# Patient Record
Sex: FEMALE | Race: OTHER | Employment: UNEMPLOYED | URBAN - METROPOLITAN AREA
[De-identification: names, ages, dates, MRNs, and addresses within clinical notes are randomized per-mention and may not be internally consistent; named-entity substitution may affect disease eponyms.]

---

## 2022-08-20 ENCOUNTER — APPOINTMENT (OUTPATIENT)
Dept: CT IMAGING | Age: 42
End: 2022-08-20
Payer: COMMERCIAL

## 2022-08-20 ENCOUNTER — HOSPITAL ENCOUNTER (EMERGENCY)
Age: 42
Discharge: HOME | End: 2022-08-20
Attending: EMERGENCY MEDICINE
Payer: COMMERCIAL

## 2022-08-20 VITALS
HEIGHT: 68 IN | TEMPERATURE: 97.3 F | SYSTOLIC BLOOD PRESSURE: 142 MMHG | WEIGHT: 204 LBS | RESPIRATION RATE: 19 BRPM | BODY MASS INDEX: 30.92 KG/M2 | HEART RATE: 98 BPM | OXYGEN SATURATION: 99 % | DIASTOLIC BLOOD PRESSURE: 87 MMHG

## 2022-08-20 DIAGNOSIS — G93.89 ENCEPHALOMALACIA: ICD-10-CM

## 2022-08-20 DIAGNOSIS — R20.0 LEFT SIDED NUMBNESS: Primary | ICD-10-CM

## 2022-08-20 LAB
ALBUMIN SERPL-MCNC: 4.6 G/DL (ref 3.5–5.2)
ALP BLD-CCNC: 65 U/L (ref 35–104)
ALT SERPL-CCNC: 12 U/L (ref 0–32)
ANION GAP SERPL CALCULATED.3IONS-SCNC: 10 MMOL/L (ref 7–16)
AST SERPL-CCNC: 15 U/L (ref 0–31)
BACTERIA: ABNORMAL /HPF
BASOPHILS ABSOLUTE: 0.03 E9/L (ref 0–0.2)
BASOPHILS RELATIVE PERCENT: 0.3 % (ref 0–2)
BILIRUB SERPL-MCNC: <0.2 MG/DL (ref 0–1.2)
BILIRUBIN URINE: NEGATIVE
BLOOD, URINE: NEGATIVE
BUN BLDV-MCNC: 12 MG/DL (ref 6–20)
CALCIUM SERPL-MCNC: 9.1 MG/DL (ref 8.6–10.2)
CHLORIDE BLD-SCNC: 107 MMOL/L (ref 98–107)
CLARITY: CLEAR
CO2: 23 MMOL/L (ref 22–29)
COLOR: YELLOW
CREAT SERPL-MCNC: 1 MG/DL (ref 0.5–1)
CRYSTALS, UA: ABNORMAL /HPF
EOSINOPHILS ABSOLUTE: 0.1 E9/L (ref 0.05–0.5)
EOSINOPHILS RELATIVE PERCENT: 0.9 % (ref 0–6)
EPITHELIAL CELLS, UA: ABNORMAL /HPF
GFR AFRICAN AMERICAN: >60
GFR NON-AFRICAN AMERICAN: >60 ML/MIN/1.73
GLUCOSE BLD-MCNC: 141 MG/DL (ref 74–99)
GLUCOSE URINE: NEGATIVE MG/DL
HCG, URINE, POC: NEGATIVE
HCT VFR BLD CALC: 37.8 % (ref 34–48)
HEMOGLOBIN: 12.2 G/DL (ref 11.5–15.5)
IMMATURE GRANULOCYTES #: 0.04 E9/L
IMMATURE GRANULOCYTES %: 0.3 % (ref 0–5)
KETONES, URINE: ABNORMAL MG/DL
LEUKOCYTE ESTERASE, URINE: ABNORMAL
LYMPHOCYTES ABSOLUTE: 2.25 E9/L (ref 1.5–4)
LYMPHOCYTES RELATIVE PERCENT: 19.5 % (ref 20–42)
Lab: NORMAL
MAGNESIUM: 2.1 MG/DL (ref 1.6–2.6)
MCH RBC QN AUTO: 28.2 PG (ref 26–35)
MCHC RBC AUTO-ENTMCNC: 32.3 % (ref 32–34.5)
MCV RBC AUTO: 87.3 FL (ref 80–99.9)
MONOCYTES ABSOLUTE: 0.66 E9/L (ref 0.1–0.95)
MONOCYTES RELATIVE PERCENT: 5.7 % (ref 2–12)
NEGATIVE QC PASS/FAIL: NORMAL
NEUTROPHILS ABSOLUTE: 8.43 E9/L (ref 1.8–7.3)
NEUTROPHILS RELATIVE PERCENT: 73.3 % (ref 43–80)
NITRITE, URINE: NEGATIVE
PDW BLD-RTO: 11.9 FL (ref 11.5–15)
PH UA: 6 (ref 5–9)
PLATELET # BLD: 363 E9/L (ref 130–450)
PMV BLD AUTO: 9.3 FL (ref 7–12)
POSITIVE QC PASS/FAIL: NORMAL
POTASSIUM SERPL-SCNC: 3.8 MMOL/L (ref 3.5–5)
PROTEIN UA: NEGATIVE MG/DL
RBC # BLD: 4.33 E12/L (ref 3.5–5.5)
RBC UA: ABNORMAL /HPF (ref 0–2)
SODIUM BLD-SCNC: 140 MMOL/L (ref 132–146)
SPECIFIC GRAVITY UA: >=1.03 (ref 1–1.03)
TOTAL PROTEIN: 7.6 G/DL (ref 6.4–8.3)
TROPONIN, HIGH SENSITIVITY: <6 NG/L (ref 0–9)
UROBILINOGEN, URINE: 1 E.U./DL
WBC # BLD: 11.5 E9/L (ref 4.5–11.5)
WBC UA: ABNORMAL /HPF (ref 0–5)

## 2022-08-20 PROCEDURE — 36415 COLL VENOUS BLD VENIPUNCTURE: CPT

## 2022-08-20 PROCEDURE — 93005 ELECTROCARDIOGRAM TRACING: CPT | Performed by: PHYSICIAN ASSISTANT

## 2022-08-20 PROCEDURE — 83735 ASSAY OF MAGNESIUM: CPT

## 2022-08-20 PROCEDURE — 80053 COMPREHEN METABOLIC PANEL: CPT

## 2022-08-20 PROCEDURE — 70450 CT HEAD/BRAIN W/O DYE: CPT

## 2022-08-20 PROCEDURE — 99284 EMERGENCY DEPT VISIT MOD MDM: CPT

## 2022-08-20 PROCEDURE — 72125 CT NECK SPINE W/O DYE: CPT

## 2022-08-20 PROCEDURE — 81001 URINALYSIS AUTO W/SCOPE: CPT

## 2022-08-20 PROCEDURE — 84484 ASSAY OF TROPONIN QUANT: CPT

## 2022-08-20 PROCEDURE — 85025 COMPLETE CBC W/AUTO DIFF WBC: CPT

## 2022-08-20 RX ORDER — DIPHENHYDRAMINE HYDROCHLORIDE 50 MG/ML
25 INJECTION INTRAMUSCULAR; INTRAVENOUS ONCE
Status: DISCONTINUED | OUTPATIENT
Start: 2022-08-20 | End: 2022-08-20 | Stop reason: HOSPADM

## 2022-08-20 RX ORDER — 0.9 % SODIUM CHLORIDE 0.9 %
1000 INTRAVENOUS SOLUTION INTRAVENOUS ONCE
Status: DISCONTINUED | OUTPATIENT
Start: 2022-08-20 | End: 2022-08-20 | Stop reason: HOSPADM

## 2022-08-20 RX ORDER — METOCLOPRAMIDE HYDROCHLORIDE 5 MG/ML
10 INJECTION INTRAMUSCULAR; INTRAVENOUS ONCE
Status: DISCONTINUED | OUTPATIENT
Start: 2022-08-20 | End: 2022-08-20 | Stop reason: HOSPADM

## 2022-08-20 ASSESSMENT — ENCOUNTER SYMPTOMS
BACK PAIN: 0
ABDOMINAL PAIN: 0
COUGH: 0
RHINORRHEA: 0
NAUSEA: 0
VOMITING: 0
BLOOD IN STOOL: 0
COLOR CHANGE: 0
SHORTNESS OF BREATH: 0

## 2022-08-20 ASSESSMENT — PAIN - FUNCTIONAL ASSESSMENT: PAIN_FUNCTIONAL_ASSESSMENT: 0-10

## 2022-08-20 NOTE — ED PROVIDER NOTES
ED PROVIDER NOTE    Chief Complaint   Patient presents with    Numbness     Patient states intermittent left sided numbness x one week. States syncopal episode in store last week. Denies injury. Hx of hydrocephalus. +intermittent dizziness. +n/-v/-d. A&O x 3 and ambulatory into ED. HPI:  22,   Time: 7:40 PM EDT       Theresa Zuluaga is a 37 y.o. female presenting to the ED for left sided numbness. Gradual onset and intermittent over the past 1-2 weeks, moderate in severity, no aggravating/alleviating factors. Hx of  hydrocephalus s/p  shunt in 1980s, shunt is not currently functional.  Patient moved here from [place] a couple months ago and has not seen neurosurgery locally. Has chronic headaches unchanged from baseline. Associated nausea. No associated fever, chills, neck stiffness, chest pain, cough, shortness of breath, abdominal pain, vomiting, diarrhea. No drug/etoh use. Chart review: hx of hydrocephalus, migraines    Review of Systems:     Review of Systems   Constitutional:  Negative for appetite change, chills and fever. HENT:  Negative for congestion and rhinorrhea. Eyes:  Negative for visual disturbance. Respiratory:  Negative for cough and shortness of breath. Cardiovascular:  Negative for chest pain. Gastrointestinal:  Negative for abdominal pain, blood in stool, nausea and vomiting. Genitourinary:  Negative for decreased urine volume and difficulty urinating. Musculoskeletal:  Negative for back pain and neck pain. Skin:  Negative for color change. Neurological:  Positive for numbness and headaches. Negative for dizziness, syncope, weakness and light-headedness. --------------------------------------------- PAST HISTORY ---------------------------------------------  Past Medical History:   Past Medical History:   Diagnosis Date    Hydrocephalus ([address]     Migraines        Past Surgical History:   History reviewed.  No pertinent surgical history. Social History:   Social History     Socioeconomic History    Marital status: Unknown     Spouse name: None    Number of children: None    Years of education: None    Highest education level: None   Tobacco Use    Smoking status: Never    Smokeless tobacco: Never   Substance and Sexual Activity    Drug use: Never       Family History:   History reviewed. No pertinent family history. The patients home medications have been reviewed. Allergies:   No Known Allergies        ---------------------------------------------------PHYSICAL EXAM--------------------------------------    BP (!) 146/82   Pulse (!) 110   Temp 97.3 °F (36.3 °C)   Resp 16   Ht 5' 8\" (1.727 m)   Wt 204 lb (92.5 kg)   LMP 08/12/2022   SpO2 96%   BMI 31.02 kg/m²     Physical Exam  Vitals and nursing note reviewed. Constitutional:       General: She is not in acute distress. Appearance: She is not toxic-appearing. HENT:      Mouth/Throat:      Mouth: Mucous membranes are moist.   Eyes:      General: No scleral icterus. Extraocular Movements: Extraocular movements intact. Pupils: Pupils are equal, round, and reactive to light. Cardiovascular:      Rate and Rhythm: Normal rate and regular rhythm. Pulses: Normal pulses. Heart sounds: Normal heart sounds. No murmur heard. Pulmonary:      Effort: Pulmonary effort is normal. No respiratory distress. Breath sounds: Normal breath sounds. No wheezing or rales. Abdominal:      General: There is no distension. Palpations: Abdomen is soft. Tenderness: There is no abdominal tenderness. Musculoskeletal:         General: No swelling or tenderness. Normal range of motion. Cervical back: Normal range of motion and neck supple. Comments: Radial, DP, and PT pulses 2+ bilaterally. Skin:     General: Skin is warm and dry. Neurological:      Mental Status: She is alert and oriented to person, place, and time.       Comments: Strength 5/5 and sensation grossly intact to light touch throughout all extremities. Sensation is diminished diffusely throughout left side of face, LUE, LLE, and left side of trunk compared with the right side. -------------------------------------------------- RESULTS -------------------------------------------------  I have personally reviewed all laboratory and imaging results for this patient. Results are listed below. LABS:  Labs Reviewed   CBC WITH AUTO DIFFERENTIAL - Abnormal; Notable for the following components:       Result Value    Lymphocytes % 19.5 (*)     Neutrophils Absolute 8.43 (*)     All other components within normal limits   COMPREHENSIVE METABOLIC PANEL - Abnormal; Notable for the following components:    Glucose 141 (*)     All other components within normal limits   URINALYSIS WITH MICROSCOPIC - Abnormal; Notable for the following components:    Ketones, Urine TRACE (*)     Leukocyte Esterase, Urine TRACE (*)     Bacteria, UA FEW (*)     Crystals, UA Few (*)     All other components within normal limits   POC PREGNANCY UR-QUAL - Normal   MAGNESIUM   TROPONIN       RADIOLOGY:  Interpreted personally and by Radiologist.  CT HEAD WO CONTRAST   Final Result   No acute intracranial abnormality. Right frontal catheter is noted with the   tip in the CSF space anteriorly. Atrophy and encephalomalacia in the right   frontal area. No acute cervical spine abnormality. CT CERVICAL SPINE WO CONTRAST   Final Result   No acute intracranial abnormality. Right frontal catheter is noted with the   tip in the CSF space anteriorly. Atrophy and encephalomalacia in the right   frontal area. No acute cervical spine abnormality. EKG:  This EKG is signed and interpreted by the EP.     Normal sinus rhythm, vent rate 88bpm, normal axis and intervals, no acute injury pattern, no clinically significant change compared w/ prior EKG       ------------------------- NURSING NOTES AND VITALS REVIEWED ---------------------------   The nursing notes within the ED encounter and vital signs as below have been reviewed by myself. BP (!) 146/82   Pulse (!) 110   Temp 97.3 °F (36.3 °C)   Resp 16   Ht 5' 8\" (1.727 m)   Wt 204 lb (92.5 kg)   LMP 2022   SpO2 96%   BMI 31.02 kg/m²   Oxygen Saturation Interpretation: Normal    The patients available past medical records and past encounters were reviewed. ------------------------------ ED COURSE/MEDICAL DECISION MAKING----------------------    Counseling: The emergency provider has spoken with the patient and discussed todays results, in addition to providing specific details for the plan of care and counseling regarding the diagnosis and prognosis. Questions are answered at this time and they are agreeable with the plan. ED Course/Medical Decision Makin y.o. female here with left sided numbness ongoing for the past 1-2 weeks. Non-toxic appearing, afebrile, hemodynamically stable, and in no acute distress. Breathing comfortably on room air without respiratory distress. Diffusely diminished left sided sensation. Imaging shows chronic right frontal encephalomalacia. No indication for admission or further imaging at this time. Low suspicion for acute ischemic stroke. After discussion of findings and return precautions, patient agrees with plan for discharge and outpatient follow up with PCP and neurology. --------------------------------- IMPRESSION AND DISPOSITION ---------------------------------    IMPRESSION  1. Left sided numbness    2. Encephalomalacia        DISPOSITION  Disposition: Discharge to home  Patient condition is good    NOTE: This report was transcribed using voice recognition software.  Every effort was made to ensure accuracy; however, inadvertent computerized transcription errors may be present    Rock Shayy MD  Attending Emergency Physician         Rock Shayy MD  [phone]

## 2022-08-20 NOTE — ED NOTES
Department of Emergency Medicine  FIRST PROVIDER TRIAGE NOTE             Independent MLP           8/20/22  5:31 PM EDT    Date of Encounter: 8/20/22   MRN: [id number]      HPI: Apurva Nunez is a 37 y.o. female who presents to the ED for Numbness (Patient states intermittent left sided numbness x one week. States syncopal episode in store last week. Denies injury. Hx of hydrocephalus. +intermittent dizziness. +n/-v/-d. A&O x 3 and ambulatory into ED. )    Patient has a 70-year-old that has a history of hydrocephalus. Patient states for the last few days she has had left-sided numbness on her upper and lower extremity. Patient states a few times this week she started to pass out and was caught by her . Patient states she did not hit her head. Patient states she is had intermittent dizziness. Patient states he just feels less sensitive on that side. ROS: Negative for cp, sob, abd pain, or back pain. PE: Gen Appearance/Constitutional: alert  HEENT: NC/NT. PERRLA,  Airway patent. Neck: supple     Initial Plan of Care: All treatment areas with department are currently occupied. Plan to order/Initiate the following while awaiting opening in ED: labs, EKG, and imaging studies.   Initiate Treatment-Testing, Proceed toTreatment Area When Bed Available for ED Attending/MLP to Continue Care    Electronically signed by Terrie Tate PA-C   DD: 8/20/22       Terrie Tate PA-C  08/20/22 [id number]

## 2022-08-21 LAB
EKG ATRIAL RATE: 88 BPM
EKG P AXIS: 70 DEGREES
EKG P-R INTERVAL: 158 MS
EKG Q-T INTERVAL: 380 MS
EKG QRS DURATION: 80 MS
EKG QTC CALCULATION (BAZETT): 459 MS
EKG R AXIS: 60 DEGREES
EKG T AXIS: 54 DEGREES
EKG VENTRICULAR RATE: 88 BPM

## 2022-08-21 NOTE — DISCHARGE INSTRUCTIONS
Return to the ER if you have worsening headache, vomiting, unable to keep down food or drink, difficulty walking, talking, or seeing, stiffness in your neck, or any other new or concerning symptoms. Follow up with your primary care physician and neurology at the next available appointment.

## 2022-08-22 ENCOUNTER — TELEPHONE (OUTPATIENT)
Dept: NEUROLOGY | Age: 42
End: 2022-08-22

## 2022-08-22 NOTE — TELEPHONE ENCOUNTER
ED 8/20/22 Encephalomalacia  Left sided numbness. CT Head/spine [facility]. Patient requesting follow up per discharge instructions/ Please advise patient for follow up recommendation [phone].

## 2022-08-23 ENCOUNTER — TELEPHONE (OUTPATIENT)
Dept: FAMILY MEDICINE CLINIC | Age: 42
End: 2022-08-23

## 2022-08-23 NOTE — TELEPHONE ENCOUNTER
----- Message from Judy Camarillo sent at 8/22/2022 10:02 AM EDT -----  Subject: Message to Provider    QUESTIONS  Information for Provider? PT IS GOING TO BE A NEW PT EST CARE BUT WENT TO   [facility] ON 8- FOR LEFT SIDE NUMBNESS AND NEEDS A SOONER   APPT IF POSSIBLE SCREENED GREEN  ---------------------------------------------------------------------------  --------------  Chad BEASLEY  [phone]; OK to leave message on voicemail  ---------------------------------------------------------------------------  --------------  SCRIPT ANSWERS  Relationship to Patient?  Self

## 2022-08-30 ENCOUNTER — OFFICE VISIT (OUTPATIENT)
Dept: FAMILY MEDICINE CLINIC | Age: 42
End: 2022-08-30
Payer: COMMERCIAL

## 2022-08-30 VITALS
SYSTOLIC BLOOD PRESSURE: 118 MMHG | TEMPERATURE: 98 F | BODY MASS INDEX: 31.98 KG/M2 | DIASTOLIC BLOOD PRESSURE: 78 MMHG | RESPIRATION RATE: 16 BRPM | HEIGHT: 66 IN | OXYGEN SATURATION: 97 % | WEIGHT: 199 LBS

## 2022-08-30 DIAGNOSIS — G93.89 ENCEPHALOMALACIA: Primary | ICD-10-CM

## 2022-08-30 DIAGNOSIS — R53.83 FATIGUE, UNSPECIFIED TYPE: ICD-10-CM

## 2022-08-30 DIAGNOSIS — G43.809 OTHER MIGRAINE WITHOUT STATUS MIGRAINOSUS, NOT INTRACTABLE: ICD-10-CM

## 2022-08-30 DIAGNOSIS — E66.09 CLASS 1 OBESITY DUE TO EXCESS CALORIES WITHOUT SERIOUS COMORBIDITY WITH BODY MASS INDEX (BMI) OF 32.0 TO 32.9 IN ADULT: ICD-10-CM

## 2022-08-30 DIAGNOSIS — Z86.69 HISTORY OF HYDROCEPHALUS AS A CHILD: ICD-10-CM

## 2022-08-30 DIAGNOSIS — Z98.2 S/P VP SHUNT: ICD-10-CM

## 2022-08-30 PROBLEM — G91.9 HYDROCEPHALUS (HCC): Status: ACTIVE | Noted: 2022-08-30

## 2022-08-30 LAB
CHOLESTEROL, TOTAL: 191 MG/DL (ref 0–199)
HBA1C MFR BLD: 6.1 % (ref 4–5.6)
HDLC SERPL-MCNC: 38 MG/DL
LDL CHOLESTEROL CALCULATED: 127 MG/DL (ref 0–99)
TRIGL SERPL-MCNC: 128 MG/DL (ref 0–149)
VLDLC SERPL CALC-MCNC: 26 MG/DL

## 2022-08-30 PROCEDURE — 99203 OFFICE O/P NEW LOW 30 MIN: CPT | Performed by: FAMILY MEDICINE

## 2022-08-30 PROCEDURE — 36415 COLL VENOUS BLD VENIPUNCTURE: CPT | Performed by: FAMILY MEDICINE

## 2022-08-30 SDOH — ECONOMIC STABILITY: FOOD INSECURITY: WITHIN THE PAST 12 MONTHS, YOU WORRIED THAT YOUR FOOD WOULD RUN OUT BEFORE YOU GOT MONEY TO BUY MORE.: NEVER TRUE

## 2022-08-30 SDOH — HEALTH STABILITY: PHYSICAL HEALTH: ON AVERAGE, HOW MANY DAYS PER WEEK DO YOU ENGAGE IN MODERATE TO STRENUOUS EXERCISE (LIKE A BRISK WALK)?: 5 DAYS

## 2022-08-30 SDOH — ECONOMIC STABILITY: FOOD INSECURITY: WITHIN THE PAST 12 MONTHS, THE FOOD YOU BOUGHT JUST DIDN'T LAST AND YOU DIDN'T HAVE MONEY TO GET MORE.: NEVER TRUE

## 2022-08-30 ASSESSMENT — ENCOUNTER SYMPTOMS
DIARRHEA: 0
SHORTNESS OF BREATH: 0
VOMITING: 0
ABDOMINAL PAIN: 0
NAUSEA: 0
EYE PAIN: 0
COUGH: 0
BLOOD IN STOOL: 0
EYE DISCHARGE: 0
CONSTIPATION: 0

## 2022-08-30 ASSESSMENT — PATIENT HEALTH QUESTIONNAIRE - PHQ9
1. LITTLE INTEREST OR PLEASURE IN DOING THINGS: 2
9. THOUGHTS THAT YOU WOULD BE BETTER OFF DEAD, OR OF HURTING YOURSELF: 0
SUM OF ALL RESPONSES TO PHQ QUESTIONS 1-9: 10
8. MOVING OR SPEAKING SO SLOWLY THAT OTHER PEOPLE COULD HAVE NOTICED. OR THE OPPOSITE, BEING SO FIGETY OR RESTLESS THAT YOU HAVE BEEN MOVING AROUND A LOT MORE THAN USUAL: 0
6. FEELING BAD ABOUT YOURSELF - OR THAT YOU ARE A FAILURE OR HAVE LET YOURSELF OR YOUR FAMILY DOWN: 0
2. FEELING DOWN, DEPRESSED OR HOPELESS: 2
10. IF YOU CHECKED OFF ANY PROBLEMS, HOW DIFFICULT HAVE THESE PROBLEMS MADE IT FOR YOU TO DO YOUR WORK, TAKE CARE OF THINGS AT HOME, OR GET ALONG WITH OTHER PEOPLE: 1
7. TROUBLE CONCENTRATING ON THINGS, SUCH AS READING THE NEWSPAPER OR WATCHING TELEVISION: 0
SUM OF ALL RESPONSES TO PHQ QUESTIONS 1-9: 10
3. TROUBLE FALLING OR STAYING ASLEEP: 2
5. POOR APPETITE OR OVEREATING: 2
SUM OF ALL RESPONSES TO PHQ9 QUESTIONS 1 & 2: 4
4. FEELING TIRED OR HAVING LITTLE ENERGY: 2

## 2022-08-30 ASSESSMENT — SOCIAL DETERMINANTS OF HEALTH (SDOH)
WITHIN THE LAST YEAR, HAVE YOU BEEN AFRAID OF YOUR PARTNER OR EX-PARTNER?: NO
WITHIN THE LAST YEAR, HAVE YOU BEEN HUMILIATED OR EMOTIONALLY ABUSED IN OTHER WAYS BY YOUR PARTNER OR EX-PARTNER?: PATIENT DECLINED
WITHIN THE LAST YEAR, HAVE TO BEEN RAPED OR FORCED TO HAVE ANY KIND OF SEXUAL ACTIVITY BY YOUR PARTNER OR EX-PARTNER?: NO
WITHIN THE LAST YEAR, HAVE YOU BEEN KICKED, HIT, SLAPPED, OR OTHERWISE PHYSICALLY HURT BY YOUR PARTNER OR EX-PARTNER?: NO
HOW HARD IS IT FOR YOU TO PAY FOR THE VERY BASICS LIKE FOOD, HOUSING, MEDICAL CARE, AND HEATING?: NOT HARD AT ALL

## 2022-08-30 NOTE — PROGRESS NOTES
[facility]  DATE OF VISIT : 2022    Patient : Mariely Lopez   Age : 37 y.o.  : 1980   MRN : [id number]   ______________________________________________________________________    Chief Complaint :   Chief Complaint   Patient presents with    New Patient     Patient is here today to establish care as a new patient with provider. Patient was admitted encephalomalacia. Patient was placed on Compazine 10 mg ion  but stopped taking it due to no having insurance. HPI : Mariely Lopez is 37 y.o. female who presented to the clinic today for new patient encounter. History of  hydrocephalus s/p  shunt in  which is currently nonfunctional at the time. Recent visit to the ED on  for gradual onset left-sided weakness. At the time she was found to have diminished sensation diffusely through the left side face, LUE, LLE and trunk compared to the right. Imaging in the ED did show atrophy and encephalomalacia in the right frontal area. Patient was discharged home with plan to follow-up with neurology, referral was placed to Dr. Yadira Rodriguez. History of migraines: Had previously been on Compazine but has not been taking since 2018. Having daily headaches but does have migraines about 3 times a month. History of childhood asthma: has not needed an inhaler in years. I reviewed the patient's past medications, allergies and past medical history during this visit.     Past Medical History :    Health Maintenance-   Colonoscopy- no    Ob/Gyn:  Menarche- 12yo  LMP- 8/15  Last PAP smear- 2017- no abnormal  Ob Hx- G0    Mammogram- 2017- normal      Past Medical History:   Diagnosis Date    Anxiety     Asthma     Hydrocephalus ([address]     Hydrocephalus (HCC)     Low iron     Migraines      Past Surgical History:   Procedure Laterality Date    FINGER SURGERY      SHUNT EXTERNALIZATION         Social History :  Social History       Tobacco History Smoking Status  Never      Smokeless Tobacco Use  Never              Alcohol History       Alcohol Use Status  Not Asked              Drug Use       Drug Use Status  Never              Sexual Activity       Sexually Active  Not Asked                     Allergies : Allergies   Allergen Reactions    Aspirin Headaches, Nausea Only and Other (See Comments)    Adhesive Tape Hives, Itching and Rash       Medication List :    No current outpatient medications on file. No current facility-administered medications for this visit. Review of Systems :  Review of Systems   Constitutional:  Negative for chills, diaphoresis and fever. Eyes:  Negative for pain, discharge and visual disturbance. Respiratory:  Negative for cough and shortness of breath. Cardiovascular:  Negative for chest pain, palpitations and leg swelling. Gastrointestinal:  Negative for abdominal pain, blood in stool, constipation, diarrhea, nausea and vomiting. Endocrine: Negative for polyuria. Genitourinary:  Negative for difficulty urinating, dysuria, hematuria and urgency. Musculoskeletal:  Negative for arthralgias, joint swelling and myalgias. Neurological:  Positive for numbness and headaches. Negative for dizziness, weakness and light-headedness. Psychiatric/Behavioral:  Negative for behavioral problems and sleep disturbance. The patient is not nervous/anxious. ______________________________________________________________________    Physical Exam :    Vitals: /78 (Site: Left Upper Arm, Position: Sitting, Cuff Size: Large Adult)   Temp 98 °F (36.7 °C) (Temporal)   Resp 16   Ht 5' 6\" (1.676 m)   Wt 199 lb (90.3 kg)   LMP 08/15/2022   SpO2 97%   BMI 32.12 kg/m²   Physical Exam  Constitutional:       General: She is not in acute distress. Appearance: Normal appearance. She is not ill-appearing. Cardiovascular:      Rate and Rhythm: Normal rate and regular rhythm. Pulses: Normal pulses. Heart sounds: Normal heart sounds. No murmur heard. Pulmonary:      Effort: Pulmonary effort is normal. No respiratory distress. Breath sounds: Normal breath sounds. No wheezing. Abdominal:      General: Bowel sounds are normal. There is no distension. Palpations: Abdomen is soft. Tenderness: There is no abdominal tenderness. Musculoskeletal:      Right lower leg: No edema. Left lower leg: No edema. Neurological:      Mental Status: She is alert and oriented to person, place, and time. Cranial Nerves: No cranial nerve deficit. Sensory: Sensory deficit (LUE, LLE and left side of the trunk.) present. Motor: No weakness. ___________________    Assessment & Plan :    1. Encephalomalacia  2. History of hydrocephalus as a child  3. S/P  shunt  -Noted on CT scan of the head during recent ER visit  -Plan for follow-up with neurology  - Heladio Mcneill MD, Neurology, [place]    4. Class 1 obesity due to excess calories without serious comorbidity with body mass index (BMI) of 32.0 to 32.9 in adult  - Hemoglobin A1C; Future  - LIPID PANEL; Future    5. Health maintenance  - Hepatitis C Antibody; Future  - HIV Screen; Future    Educational materials and/or home exercises printed for patient's review and were included in patient instructions on his/her After Visit Summary and given to patient at the end of visit. Counseled regarding above diagnosis, including possible risks and complications,  especially if left uncontrolled. Counseled regarding the possible side effects, risks, benefits and alternatives to treatment; patient and/or guardian verbalizes understanding, agrees, feels comfortable with and wishes to proceed with above treatment plan. Advised patient to call with any new medication issues, and read all Rx info from pharmacy to assure aware of all possible risks and side effects of medication before taking.      Reviewed age and gender appropriate health screening exams and vaccinations. Advised patient regarding importance of keeping up with recommended health maintenance and to schedule as soon as possible if overdue, as this is important in assessing for undiagnosed pathology, especially cancer, as well as protecting against potentially harmful/life threatening disease. Patient and/or guardian verbalizes understanding and agrees with above counseling, assessment and plan. All questions answered    Additional plan and future considerations:   2 weeks for [employer]. Return to Office: Return in about 2 weeks (around 9/13/2022) for Dennys Enriquez.     Electronically signed by Herlinda Subramanian MD on 8/30/2022 at 2:08 PM

## 2022-08-31 ENCOUNTER — TELEPHONE (OUTPATIENT)
Dept: FAMILY MEDICINE CLINIC | Age: 42
End: 2022-08-31

## 2022-08-31 DIAGNOSIS — G93.89 ENCEPHALOMALACIA: Primary | ICD-10-CM

## 2022-08-31 DIAGNOSIS — G91.9 HYDROCEPHALUS, UNSPECIFIED TYPE (HCC): ICD-10-CM

## 2022-08-31 NOTE — TELEPHONE ENCOUNTER
----- Message from Carlee Moncada sent at 8/31/2022  9:53 AM EDT -----  Subject: Referral Request    Reason for referral request? Pt is requesting a referral for a neurologist   specialist because her current one is retiring. Pt would like to go to   location in or near [facility] in [facility]. Please contact pt when this   is approved and ready for scheduling. Provider patient wants to be referred to(if known):     Provider Phone Number(if known):     Additional Information for Provider?   ---------------------------------------------------------------------------  --------------  [id number]    [phone]; OK to leave message on voicemail, OK to respond with   electronic message via [employer] portal (only for patients who have   registered [employer] account)  ---------------------------------------------------------------------------  --------------

## 2022-09-04 LAB
HEPATITIS C ANTIBODY INTERPRETATION: NORMAL
HIV-1 AND HIV-2 ANTIBODIES: NORMAL

## 2022-09-13 ENCOUNTER — OFFICE VISIT (OUTPATIENT)
Dept: FAMILY MEDICINE CLINIC | Age: 42
End: 2022-09-13
Payer: COMMERCIAL

## 2022-09-13 VITALS
SYSTOLIC BLOOD PRESSURE: 128 MMHG | TEMPERATURE: 97.3 F | DIASTOLIC BLOOD PRESSURE: 80 MMHG | RESPIRATION RATE: 18 BRPM | OXYGEN SATURATION: 100 % | HEIGHT: 66 IN | BODY MASS INDEX: 31.69 KG/M2 | HEART RATE: 67 BPM | WEIGHT: 197.2 LBS

## 2022-09-13 DIAGNOSIS — Z98.890 HISTORY OF BREAST LUMP/MASS EXCISION: Primary | ICD-10-CM

## 2022-09-13 DIAGNOSIS — Z01.419 WELL WOMAN EXAM WITH ROUTINE GYNECOLOGICAL EXAM: ICD-10-CM

## 2022-09-13 PROCEDURE — 99396 PREV VISIT EST AGE 40-64: CPT | Performed by: FAMILY MEDICINE

## 2022-09-13 ASSESSMENT — ENCOUNTER SYMPTOMS: ABDOMINAL PAIN: 0

## 2022-09-13 NOTE — PROGRESS NOTES
[facility]  DATE OF VISIT : 2022    Patient : Fredo Emery   Age : 37 y.o.  : 1980   MRN : [id number]   ______________________________________________________________________    Chief Complaint :   Chief Complaint   Patient presents with    Gynecologic Exam     Pap smear       HPI : Fredo Emery is 37 y.o. female who presented to the clinic today for WWE. G 0  Menarche:16 yo  Periods:  regular every 21 days. Lasts 3-4 days. Menopause/LMP: 23    Sexually active : yes . OCP hx: never  Last Pap smear . Previous Pap smears normal.   Fm hx of Breast cancer: no  Fm hx of Ovarian cancer: no  Fm hx of Endometrial cancer: no  Fm hx of Uterine cancer: no  Fm hx of Cervical cancer: no  Doing well. No abdominal or pelvic pain. No dyspareunia. No abnormal vaginal  Discharge or bleeding. No urinary or GI symptoms. Breast: No changes in skin, no lumps, no nipple inversion, bleeding or drainage, no axillary lymphadenopathy on self exam.     History of lump in the breast, found via mammogram in 2018, had biopsy done and no evidence of malignancy. Was supposed to follow-up with yearly mammos but never got it done. The patient was informed about the importance of regular gynecological  examination and pap smear. She was also  informed of the need for yearly mammogram after the age of 37. After age 48 ,a colonoscopy should be scheduled through her primary care physician (PCP). This will help decrease the risk of colon cancer. During the reproductive years, she should take folic acid daily in order to decrease the risk of neural tube defects such as spina bifida. Adequate calcium and vitamin D intake should be added to a healthy diet ,and weight bearing exercise continued daily for improved cardiovascular and bone health. All questions have been answered.     I reviewed the patient's past medications, allergies and past medical history during this visit. Past Medical History :    Past Medical History:   Diagnosis Date    Anxiety     Asthma     Hydrocephalus ([facility] [address]     Hydrocephalus ([facility] [address]     Low iron     Migraines      Past Surgical History:   Procedure Laterality Date    FINGER SURGERY      SHUNT EXTERNALIZATION         Social History :  Social History       Tobacco History       Smoking Status  Never      Smokeless Tobacco Use  Never              Alcohol History       Alcohol Use Status  Not Currently Comment  socially              Drug Use       Drug Use Status  Not Currently Types  Marijuana [place]low)              Sexual Activity       Sexually Active  Not Asked                     Allergies : Allergies   Allergen Reactions    Aspirin Headaches, Nausea Only and Other (See Comments)    Adhesive Tape Hives, Itching and Rash       Medication List :    No current outpatient medications on file. No current facility-administered medications for this visit. Review of Systems :  Review of Systems   Gastrointestinal:  Negative for abdominal pain. Endocrine: Negative for polyuria. Genitourinary:  Negative for dyspareunia, dysuria, frequency, genital sores, hematuria, menstrual problem, pelvic pain, urgency, vaginal bleeding, vaginal discharge and vaginal pain.   ______________________________________________________________________    Physical Exam :    Vitals: /80 (Site: Left Upper Arm, Position: Sitting, Cuff Size: Large Adult)   Pulse 67   Temp 97.3 °F (36.3 °C) (Temporal)   Resp 18   Ht 5' 6\" (1.676 m)   Wt 197 lb 3.2 oz (89.4 kg)   LMP 08/15/2022   SpO2 100%   BMI 31.83 kg/m²   Physical Exam  Constitutional:       General: She is not in acute distress. Appearance: Normal appearance. She is not ill-appearing. Cardiovascular:      Rate and Rhythm: Normal rate and regular rhythm. Pulses: Normal pulses. Heart sounds: Normal heart sounds. No murmur heard.   Pulmonary:      Effort: Pulmonary effort is normal. No respiratory distress. Breath sounds: Normal breath sounds. No wheezing. Chest:   Breasts:     Breasts are symmetrical.      Right: Normal. No swelling, bleeding, inverted nipple, mass, nipple discharge, skin change, tenderness, axillary adenopathy or supraclavicular adenopathy. Left: Normal. No swelling, bleeding, inverted nipple, mass, nipple discharge, skin change, tenderness, axillary adenopathy or supraclavicular adenopathy. Abdominal:      General: Bowel sounds are normal. There is no distension. Palpations: Abdomen is soft. Tenderness: There is no abdominal tenderness. Hernia: There is no hernia in the left inguinal area or right inguinal area. Genitourinary:     Pubic Area: No rash. Labia:         Right: No rash, lesion or injury. Left: No rash, lesion or injury. Vagina: Normal. No signs of injury. No vaginal discharge, tenderness, bleeding, lesions or prolapsed vaginal walls. Cervix: No cervical motion tenderness, discharge, friability or lesion. Uterus: Normal. Not enlarged and not tender. Adnexa: Right adnexa normal and left adnexa normal.        Right: No mass, tenderness or fullness. Left: No mass, tenderness or fullness. Musculoskeletal:      Right lower leg: No edema. Left lower leg: No edema. Lymphadenopathy:      Upper Body:      Right upper body: No supraclavicular, axillary or pectoral adenopathy. Left upper body: No supraclavicular, axillary or pectoral adenopathy. Lower Body: No right inguinal adenopathy. No left inguinal adenopathy. Neurological:      Mental Status: She is alert.         ___________________    Assessment & Plan :    1. Well woman exam with routine gynecological exam  - PAP SMEAR    2. History of breast lump/mass excision  -History of breast lump on the right found on exam in 2018, assessed by mammogram and FNA performed showed no malignancy.   Patient never did follow-up mammo  - HILDA DIGITAL SCREEN BILATERAL PER PROTOCOL; Future    Educational materials and/or home exercises printed for patient's review and were included in patient instructions on his/her After Visit Summary and given to patient at the end of visit. Counseled regarding above diagnosis, including possible risks and complications,  especially if left uncontrolled. Counseled regarding the possible side effects, risks, benefits and alternatives to treatment; patient and/or guardian verbalizes understanding, agrees, feels comfortable with and wishes to proceed with above treatment plan. Advised patient to call with any new medication issues, and read all Rx info from pharmacy to assure aware of all possible risks and side effects of medication before taking. Reviewed age and gender appropriate health screening exams and vaccinations. Advised patient regarding importance of keeping up with recommended health maintenance and to schedule as soon as possible if overdue, as this is important in assessing for undiagnosed pathology, especially cancer, as well as protecting against potentially harmful/life threatening disease. Patient and/or guardian verbalizes understanding and agrees with above counseling, assessment and plan. All questions answered    Additional plan and future considerations:   RTO in 1 year if all is stable    Return to Office: No follow-ups on file.     Electronically signed by Lio Cui MD on 9/13/2022 at 1:55 PM

## 2022-09-15 LAB
HPV SAMPLE: NORMAL
HPV TYPE 16: NOT DETECTED
HPV TYPE 18: NOT DETECTED
HPV, HIGH RISK OTHER: NOT DETECTED
INTERPRETATION: NORMAL
SOURCE: NORMAL

## 2022-10-27 ENCOUNTER — TELEPHONE (OUTPATIENT)
Dept: NEUROLOGY | Age: 42
End: 2022-10-27

## 2022-11-28 ENCOUNTER — OFFICE VISIT (OUTPATIENT)
Dept: NEUROLOGY | Age: 42
End: 2022-11-28
Payer: COMMERCIAL

## 2022-11-28 VITALS
OXYGEN SATURATION: 96 % | BODY MASS INDEX: 30.22 KG/M2 | WEIGHT: 188 LBS | HEIGHT: 66 IN | DIASTOLIC BLOOD PRESSURE: 73 MMHG | TEMPERATURE: 98.3 F | HEART RATE: 68 BPM | SYSTOLIC BLOOD PRESSURE: 110 MMHG

## 2022-11-28 DIAGNOSIS — G91.9 HYDROCEPHALUS, UNSPECIFIED TYPE (HCC): Primary | ICD-10-CM

## 2022-11-28 PROCEDURE — 99203 OFFICE O/P NEW LOW 30 MIN: CPT | Performed by: NURSE PRACTITIONER

## 2022-11-28 RX ORDER — IBUPROFEN 800 MG/1
TABLET ORAL
COMMUNITY
Start: 2022-11-21

## 2022-11-28 RX ORDER — AMOXICILLIN 500 MG/1
TABLET, FILM COATED ORAL
COMMUNITY
Start: 2022-11-21

## 2022-11-28 RX ORDER — GABAPENTIN 100 MG/1
100 CAPSULE ORAL 3 TIMES DAILY
Qty: 90 CAPSULE | Refills: 2 | Status: SHIPPED | OUTPATIENT
Start: 2022-11-28 | End: 2022-12-28

## 2022-11-28 NOTE — PROGRESS NOTES
[facility] APRN NP-C   [address], [facility] [place], [address] [address]  Phone: [phone]  Fax: [phone]       Grant Beck is a 37 y.o. right handed female     Patient presents today for evaluation and management of hydrocephalus and left-sided numbness/tingling. Patient presents with her  who provides little additional history. Patient is a decent historian. Past Medical History:     Past Medical History:   Diagnosis Date    Anxiety     Asthma     Hydrocephalus ([address]     Hydrocephalus ([address]     Low iron     Migraines        Past Surgical History:       Past Surgical History:   Procedure Laterality Date    DENTAL SURGERY      all teeth removed 11/21/22    FINGER SURGERY      R 5th finger    REFRACTIVE SURGERY Bilateral     age 1-4 yrs old    SHUNT EXTERNALIZATION      several surgeries in childhood; last one approx 1983       Allergies:       Aspirin and Adhesive tape    Medications:     Prior to Admission medications    Medication Sig Start Date End Date Taking? Authorizing Provider   Amoxicillin 500 MG TABS  11/21/22  Yes Historical Provider, MD   ibuprofen (ADVIL;MOTRIN) 800 MG tablet  11/21/22  Yes Historical Provider, MD   Acetaminophen 500 MG PACK  11/21/22  Yes Historical Provider, MD   gabapentin (NEURONTIN) 100 MG capsule Take 1 capsule by mouth 3 times daily for 30 days. Intended supply: 30 days 11/28/22 12/28/22 Yes ESPERANZA Eugene - NP       Social History:        reports that she has never smoked. She has never used smokeless tobacco. She reports that she does not currently use alcohol. She reports that she does not currently use drugs after having used the following drugs: Marijuana [place]). Patient has been  for 14 years. Patient has no children; 1 cat. Patient has been employed at [employer] since moving here from [place] back in June.       Review of Systems:     (+) numbness, tingling or focal arm/leg weakness  No chest pain or palpitations  No SOB  No vertigo, lightheadedness or loss of consciousness  No falls, tripping or stumbling  No incontinence of bowels or bladder  No itching or bruising appreciated    ROS is otherwise negative    Family History:     Family History   Problem Relation Age of Onset    Depression Mother     Depression Father     Mental Illness Sister     Cancer Paternal Grandfather         History of Present Illness:     Patient presents to neurology clinic today for evaluation and management of encephalomalacia. Patient presented to ED on 2022 with complaint of left-sided numbness x1 week. Patient states she had a syncopal episode while shopping the week prior. Patient reports that after passing out in August she began having left facial numbness which escalated to her extremities. Patient now reports that this is numbness that is occurring frequently lasting a few minutes to a few hours but at times it last all day. Patient notices that she has increased numbness and tingling with increased activity or movement for example at work. Patient reports \"cold spells\" to the left side of her body for the last few years. Patient also complains of mild headaches that vary in location; currently not frequent. Patient denies speech or swallowing difficulty, dizziness, falls or seizure activity. Of note patient has history of  hydrocephalus and complained of intermittent dizziness at the time of her ED visit. Patient also reported nausea with no vomiting or diarrhea. Patient believes her shunt was placed in the early  and was told that the shunt had moved and was currently not functioning. Patient moved from [place] back in  but reports that her shunt was placed at [facility] in [place]. Patient has not seen neurosurgery or neurology recently. Patient was born prematurely about 2 months early however vaginal birth was not traumatic. Patient's younger sister suffers from Down syndrome; there are no other siblings. Patient sleeps about 5 to 7 hours per night although sleep is broken. Patient eats 1 big meal a day and snacks 1-2 times a day. Patient drinks 2 big bottles of water daily. Patient denies caffeine use as it increases her headaches. Patient reports mild to moderate stress level. Patient walks a lot while at work but no other form of exercise at this time. Objective:       /73   Pulse 68   Temp 98.3 °F (36.8 °C) (Temporal)   Ht 5' 6\" (1.676 m)   Wt 188 lb (85.3 kg)   SpO2 96%   BMI 30.34 kg/m²     General appearance: alert, appears stated age, cooperative and in no distress  Head: normocephalic, without obvious abnormality, atraumatic  Eyes: conjunctivae/corneas clear; no drainage  Neck: supple, symmetrical, trachea midline and thyroid not enlarged  Back: symmetric, no curvature.  ROM normal.   Lungs: clear to diminished to auscultation bilaterally; unlabored breaths on room air  Heart: regular rate and rhythm  Abdomen: soft, non-tender; bowel sounds normal  Extremities: normal, atraumatic, no cyanosis or edema  Pulses: 2+ and symmetric  Skin:  color, texture, turgor normal--no rashes or lesions      Mental Status: alert and oriented x 4, slow to process at times but appropriate, follows commands well, pleasant    Appropriate attention/concentration  Intact fundus of knowledge  Repetition intact  Memories intact    Speech: no dysarthria  Language: no aphasias---reading, writing, repetition, and object identification intact    Cranial Nerves:  I: smell    II: visual acuity     II: visual fields Full to confrontation   II: pupils FELIPA   III,VII: ptosis None   III,IV,VI: extraocular muscles  Decreased smooth pursuit bilaterally; L worse than R    L eye strabismus   V: mastication Normal   V: facial light touch sensation  Decreased LT sensation to L   V,VII: corneal reflex     VII: facial muscle function - upper  Subtle L facial droop- chronic   VII: facial muscle function - lower L facial droop--chronic    VIII: hearing Normal   IX: soft palate elevation  Normal   IX,X: gag reflex    XI: trapezius strength  5/5   XI: sternocleidomastoid strength 5/5   XI: neck extension strength  5/5   XII: tongue strength  Normal     Motor:  Left  +4/5, right  5/5  Right side 5/5  Left side -5/5--chronic  Normal bulk and tone  No drift   No abnormal movements    Sensory:  LT decreased to the left; normal from calf down to knee, decreased knee down to feet  PP decreased to the left; normal from calf down to knee, decreased knee down to feet  Vibration decreased to the left    Coordination:   FN, FFM and TALIA normal  HS mildly impaired to left     Gait:  Normal    DTR:   Right Brachioradialis reflex 1+  Left Brachioradialis reflex 1+  Right Biceps reflex 1+  Left Biceps reflex 1+  Right Triceps reflex 1+  Left Triceps reflex 1+  Right Quadriceps reflex 1+  Left Quadriceps reflex 1+  Right Achilles reflex 1+  Left Achilles reflex 1+    + B/L Babinskis  No Varela's    No other pathological reflexes    Laboratory/Radiology:  ry/Radiology:     CBC with Differential:    Lab Results   Component Value Date/Time    WBC 11.5 08/20/2022 05:36 PM    RBC 4.33 08/20/2022 05:36 PM    HGB 12.2 08/20/2022 05:36 PM    HCT 37.8 08/20/2022 05:36 PM     08/20/2022 05:36 PM    MCV 87.3 08/20/2022 05:36 PM    MCH 28.2 08/20/2022 05:36 PM    MCHC 32.3 08/20/2022 05:36 PM    RDW 11.9 08/20/2022 05:36 PM    LYMPHOPCT 19.5 08/20/2022 05:36 PM    MONOPCT 5.7 08/20/2022 05:36 PM    BASOPCT 0.3 08/20/2022 05:36 PM    MONOSABS 0.66 08/20/2022 05:36 PM    LYMPHSABS 2.25 08/20/2022 05:36 PM    EOSABS 0.10 08/20/2022 05:36 PM    BASOSABS 0.03 08/20/2022 05:36 PM     CMP:    Lab Results   Component Value Date/Time     08/20/2022 05:36 PM    K 3.8 08/20/2022 05:36 PM     08/20/2022 05:36 PM    CO2 23 08/20/2022 05:36 PM    BUN 12 08/20/2022 05:36 PM CREATININE 1.0 2022 05:36 PM    GFRAA >60 2022 05:36 PM    LABGLOM >60 2022 05:36 PM    GLUCOSE 141 2022 05:36 PM    PROT 7.6 2022 05:36 PM    LABALBU 4.6 2022 05:36 PM    CALCIUM 9.1 2022 05:36 PM    BILITOT <0.2 2022 05:36 PM    ALKPHOS 65 2022 05:36 PM    AST 15 2022 05:36 PM    ALT 12 2022 05:36 PM     CT head without contrast 2022  No acute intracranial abnormality. Right frontal catheter is noted with the   tip in the CSF space anteriorly. Atrophy and encephalomalacia in the right   frontal area. CT cervical spine 2022:  No acute cervical spine abnormality    All labs and images were personally reviewed at the time of this visit    Assessment:      hydrocephalus status post  shunt in the    Patient recently told her son has moved and is not functioning although she cannot remember who told her over the last test completed for evaluation of it. Patient had a syncopal episode back in August while shopping with report of left-sided numbness and tingling developing approximately 1 week later. This now ranges in duration but very frequent (daily)   Patient reports having \" cold spells\" to her left side for last few years   I suspect patient's paresthesias have been chronic for many years exacerbated by syncopal episode.     Will order  shunt revision and start low-dose gabapentin    Headaches   Headache location and severity varies--short in duration   Currently not keeping headache diary   Headaches do not appear to be frequent or severe at this time; will monitor    Plan:      shunt revision for functioning and placement evaluation  Will start low-dose gabapentin 100 mg 3 times daily  Patient will keep a headache diary  Patient will call with issues  Return to office after testing completed        Giovanni Saint, APRN - NP, ESPERANZA, FNP-C  9:53 AM  2022    I spent 33 minutes with this patient obtaining the HPI and discussing the exam with greater than 50% of the time providing counseling and education on medications and other treatment plans. All questions were answered prior to leaving my office.

## 2022-12-08 ENCOUNTER — TELEPHONE (OUTPATIENT)
Dept: NEUROLOGY | Age: 42
End: 2022-12-08

## 2022-12-08 NOTE — TELEPHONE ENCOUNTER
----- Message from ESPERANZA Monet NP sent at 12/6/2022  2:35 PM EST -----  Regarding: RE: Gabapentin   Advise her to cut down her doses to one less than she takes now and then down to one dose a day next week until off of it. We will wean her off of it if it is causing adverse reactions. Thanks.    ----- Message -----  From: Elva Varela MA  Sent: 12/6/2022   9:13 AM EST  To: ESPERANZA Monet NP  Subject: FW: Gabapentin                                   Please advise. Electronically signed by Nish Amador on 12/6/2022 at 9:13 AM      ----- Message -----  From: Karli Miranda  Sent: 12/1/2022  11:45 PM EST  To: [facility] Clinical Staff  Subject: Gabapentin                                       I took one of the Gabapentin sand now my whole left side feels more sensitive than usual including my ear. Im feeling achy.

## 2022-12-08 NOTE — TELEPHONE ENCOUNTER
Called and left voice message for patient to call the office for directions regarding her Gabapentin.

## 2022-12-09 ENCOUNTER — HOSPITAL ENCOUNTER (EMERGENCY)
Age: 42
Discharge: LEFT BEFORE DISCHARGE | End: 2022-12-09

## 2022-12-09 VITALS — OXYGEN SATURATION: 100 % | TEMPERATURE: 98.4 F | HEART RATE: 75 BPM

## 2022-12-11 ENCOUNTER — HOSPITAL ENCOUNTER (EMERGENCY)
Age: 42
Discharge: ANOTHER HOSPITAL | End: 2022-12-11
Attending: EMERGENCY MEDICINE
Payer: COMMERCIAL

## 2022-12-11 ENCOUNTER — APPOINTMENT (OUTPATIENT)
Dept: CT IMAGING | Age: 42
End: 2022-12-11
Payer: COMMERCIAL

## 2022-12-11 ENCOUNTER — HOSPITAL ENCOUNTER (INPATIENT)
Age: 42
LOS: 3 days | Discharge: HOME | DRG: 660 | End: 2022-12-14
Attending: INTERNAL MEDICINE | Admitting: INTERNAL MEDICINE
Payer: COMMERCIAL

## 2022-12-11 VITALS
TEMPERATURE: 97.7 F | RESPIRATION RATE: 16 BRPM | HEIGHT: 66 IN | BODY MASS INDEX: 29.09 KG/M2 | HEART RATE: 76 BPM | WEIGHT: 181 LBS | DIASTOLIC BLOOD PRESSURE: 70 MMHG | OXYGEN SATURATION: 99 % | SYSTOLIC BLOOD PRESSURE: 126 MMHG

## 2022-12-11 DIAGNOSIS — N20.0 KIDNEY STONE: ICD-10-CM

## 2022-12-11 DIAGNOSIS — R10.9 ABDOMINAL PAIN, UNSPECIFIED ABDOMINAL LOCATION: Primary | ICD-10-CM

## 2022-12-11 DIAGNOSIS — R19.00 ABDOMINAL MASS, UNSPECIFIED ABDOMINAL LOCATION: ICD-10-CM

## 2022-12-11 PROBLEM — N13.5 OBSTRUCTION OF LEFT URETEROPELVIC JUNCTION (UPJ): Status: ACTIVE | Noted: 2022-12-11

## 2022-12-11 LAB
ALBUMIN SERPL-MCNC: 4.6 G/DL (ref 3.5–5.2)
ALP BLD-CCNC: 54 U/L (ref 35–104)
ALT SERPL-CCNC: 16 U/L (ref 0–32)
ANION GAP SERPL CALCULATED.3IONS-SCNC: 12 MMOL/L (ref 7–16)
AST SERPL-CCNC: 17 U/L (ref 0–31)
BACTERIA: ABNORMAL /HPF
BASOPHILS ABSOLUTE: 0.01 E9/L (ref 0–0.2)
BASOPHILS RELATIVE PERCENT: 0.1 % (ref 0–2)
BILIRUB SERPL-MCNC: 0.3 MG/DL (ref 0–1.2)
BILIRUBIN URINE: ABNORMAL
BLOOD, URINE: ABNORMAL
BUN BLDV-MCNC: 10 MG/DL (ref 6–20)
CALCIUM SERPL-MCNC: 9.7 MG/DL (ref 8.6–10.2)
CHLORIDE BLD-SCNC: 102 MMOL/L (ref 98–107)
CLARITY: ABNORMAL
CO2: 26 MMOL/L (ref 22–29)
COLOR: YELLOW
CREAT SERPL-MCNC: 1 MG/DL (ref 0.5–1)
CRYSTALS, UA: ABNORMAL /HPF
EOSINOPHILS ABSOLUTE: 0 E9/L (ref 0.05–0.5)
EOSINOPHILS RELATIVE PERCENT: 0 % (ref 0–6)
EPITHELIAL CELLS, UA: ABNORMAL /HPF
GFR SERPL CREATININE-BSD FRML MDRD: >60 ML/MIN/1.73
GLUCOSE BLD-MCNC: 143 MG/DL (ref 74–99)
GLUCOSE URINE: NEGATIVE MG/DL
HCG(URINE) PREGNANCY TEST: NEGATIVE
HCT VFR BLD CALC: 37.4 % (ref 34–48)
HEMOGLOBIN: 12.4 G/DL (ref 11.5–15.5)
IMMATURE GRANULOCYTES #: 0.06 E9/L
IMMATURE GRANULOCYTES %: 0.5 % (ref 0–5)
KETONES, URINE: NEGATIVE MG/DL
LACTIC ACID: 0.9 MMOL/L (ref 0.5–2.2)
LEUKOCYTE ESTERASE, URINE: ABNORMAL
LIPASE: 33 U/L (ref 13–60)
LYMPHOCYTES ABSOLUTE: 0.73 E9/L (ref 1.5–4)
LYMPHOCYTES RELATIVE PERCENT: 5.7 % (ref 20–42)
MAGNESIUM: 1.7 MG/DL (ref 1.6–2.6)
MCH RBC QN AUTO: 28.8 PG (ref 26–35)
MCHC RBC AUTO-ENTMCNC: 33.2 % (ref 32–34.5)
MCV RBC AUTO: 86.8 FL (ref 80–99.9)
MONOCYTES ABSOLUTE: 0.29 E9/L (ref 0.1–0.95)
MONOCYTES RELATIVE PERCENT: 2.3 % (ref 2–12)
NEUTROPHILS ABSOLUTE: 11.65 E9/L (ref 1.8–7.3)
NEUTROPHILS RELATIVE PERCENT: 91.4 % (ref 43–80)
NITRITE, URINE: NEGATIVE
PDW BLD-RTO: 12.4 FL (ref 11.5–15)
PH UA: 5 (ref 5–9)
PLATELET # BLD: 382 E9/L (ref 130–450)
PMV BLD AUTO: 10.1 FL (ref 7–12)
POTASSIUM REFLEX MAGNESIUM: 3.4 MMOL/L (ref 3.5–5)
PROTEIN UA: ABNORMAL MG/DL
RBC # BLD: 4.31 E12/L (ref 3.5–5.5)
RBC UA: ABNORMAL /HPF (ref 0–2)
SODIUM BLD-SCNC: 140 MMOL/L (ref 132–146)
SPECIFIC GRAVITY UA: >=1.03 (ref 1–1.03)
TOTAL PROTEIN: 7.8 G/DL (ref 6.4–8.3)
UROBILINOGEN, URINE: 0.2 E.U./DL
WBC # BLD: 12.7 E9/L (ref 4.5–11.5)
WBC UA: >20 /HPF (ref 0–5)

## 2022-12-11 PROCEDURE — 36415 COLL VENOUS BLD VENIPUNCTURE: CPT

## 2022-12-11 PROCEDURE — 83690 ASSAY OF LIPASE: CPT

## 2022-12-11 PROCEDURE — 81001 URINALYSIS AUTO W/SCOPE: CPT

## 2022-12-11 PROCEDURE — 6370000000 HC RX 637 (ALT 250 FOR IP)

## 2022-12-11 PROCEDURE — 83605 ASSAY OF LACTIC ACID: CPT

## 2022-12-11 PROCEDURE — 99222 1ST HOSP IP/OBS MODERATE 55: CPT | Performed by: INTERNAL MEDICINE

## 2022-12-11 PROCEDURE — 96374 THER/PROPH/DIAG INJ IV PUSH: CPT

## 2022-12-11 PROCEDURE — 80053 COMPREHEN METABOLIC PANEL: CPT

## 2022-12-11 PROCEDURE — 85025 COMPLETE CBC W/AUTO DIFF WBC: CPT

## 2022-12-11 PROCEDURE — 83735 ASSAY OF MAGNESIUM: CPT

## 2022-12-11 PROCEDURE — 81025 URINE PREGNANCY TEST: CPT

## 2022-12-11 PROCEDURE — 6360000004 HC RX CONTRAST MEDICATION: Performed by: RADIOLOGY

## 2022-12-11 PROCEDURE — 2580000003 HC RX 258: Performed by: NURSE PRACTITIONER

## 2022-12-11 PROCEDURE — 96375 TX/PRO/DX INJ NEW DRUG ADDON: CPT

## 2022-12-11 PROCEDURE — 6360000002 HC RX W HCPCS: Performed by: NURSE PRACTITIONER

## 2022-12-11 PROCEDURE — 2060000000 HC ICU INTERMEDIATE R&B

## 2022-12-11 PROCEDURE — 74177 CT ABD & PELVIS W/CONTRAST: CPT

## 2022-12-11 PROCEDURE — 87088 URINE BACTERIA CULTURE: CPT

## 2022-12-11 PROCEDURE — 2580000003 HC RX 258

## 2022-12-11 PROCEDURE — 6360000002 HC RX W HCPCS

## 2022-12-11 PROCEDURE — 99285 EMERGENCY DEPT VISIT HI MDM: CPT

## 2022-12-11 RX ORDER — POLYETHYLENE GLYCOL 3350 17 G/17G
17 POWDER, FOR SOLUTION ORAL DAILY PRN
Status: DISCONTINUED | OUTPATIENT
Start: 2022-12-11 | End: 2022-12-14 | Stop reason: HOSPADM

## 2022-12-11 RX ORDER — SODIUM CHLORIDE 0.9 % (FLUSH) 0.9 %
5-40 SYRINGE (ML) INJECTION EVERY 12 HOURS SCHEDULED
Status: DISCONTINUED | OUTPATIENT
Start: 2022-12-11 | End: 2022-12-14 | Stop reason: HOSPADM

## 2022-12-11 RX ORDER — ENOXAPARIN SODIUM 100 MG/ML
40 INJECTION SUBCUTANEOUS DAILY
Status: DISCONTINUED | OUTPATIENT
Start: 2022-12-12 | End: 2022-12-14 | Stop reason: HOSPADM

## 2022-12-11 RX ORDER — ONDANSETRON 4 MG/1
4 TABLET, ORALLY DISINTEGRATING ORAL EVERY 8 HOURS PRN
Status: DISCONTINUED | OUTPATIENT
Start: 2022-12-11 | End: 2022-12-14 | Stop reason: HOSPADM

## 2022-12-11 RX ORDER — ONDANSETRON 2 MG/ML
4 INJECTION INTRAMUSCULAR; INTRAVENOUS EVERY 6 HOURS PRN
Status: DISCONTINUED | OUTPATIENT
Start: 2022-12-11 | End: 2022-12-14 | Stop reason: HOSPADM

## 2022-12-11 RX ORDER — SODIUM CHLORIDE 0.9 % (FLUSH) 0.9 %
5-40 SYRINGE (ML) INJECTION PRN
Status: DISCONTINUED | OUTPATIENT
Start: 2022-12-11 | End: 2022-12-14 | Stop reason: HOSPADM

## 2022-12-11 RX ORDER — MORPHINE SULFATE 4 MG/ML
4 INJECTION, SOLUTION INTRAMUSCULAR; INTRAVENOUS ONCE
Status: COMPLETED | OUTPATIENT
Start: 2022-12-11 | End: 2022-12-11

## 2022-12-11 RX ORDER — MORPHINE SULFATE 4 MG/ML
4 INJECTION, SOLUTION INTRAMUSCULAR; INTRAVENOUS EVERY 4 HOURS PRN
Status: DISCONTINUED | OUTPATIENT
Start: 2022-12-11 | End: 2022-12-14 | Stop reason: HOSPADM

## 2022-12-11 RX ORDER — ACETAMINOPHEN 325 MG/1
650 TABLET ORAL EVERY 6 HOURS PRN
Status: DISCONTINUED | OUTPATIENT
Start: 2022-12-11 | End: 2022-12-14 | Stop reason: HOSPADM

## 2022-12-11 RX ORDER — SODIUM CHLORIDE 9 MG/ML
INJECTION, SOLUTION INTRAVENOUS PRN
Status: DISCONTINUED | OUTPATIENT
Start: 2022-12-11 | End: 2022-12-14 | Stop reason: HOSPADM

## 2022-12-11 RX ORDER — SODIUM CHLORIDE, SODIUM LACTATE, POTASSIUM CHLORIDE, CALCIUM CHLORIDE 600; 310; 30; 20 MG/100ML; MG/100ML; MG/100ML; MG/100ML
INJECTION, SOLUTION INTRAVENOUS CONTINUOUS
Status: DISCONTINUED | OUTPATIENT
Start: 2022-12-11 | End: 2022-12-14 | Stop reason: HOSPADM

## 2022-12-11 RX ORDER — ONDANSETRON 4 MG/1
4 TABLET, ORALLY DISINTEGRATING ORAL ONCE
Status: COMPLETED | OUTPATIENT
Start: 2022-12-11 | End: 2022-12-11

## 2022-12-11 RX ORDER — 0.9 % SODIUM CHLORIDE 0.9 %
1000 INTRAVENOUS SOLUTION INTRAVENOUS ONCE
Status: COMPLETED | OUTPATIENT
Start: 2022-12-11 | End: 2022-12-11

## 2022-12-11 RX ORDER — ACETAMINOPHEN 650 MG/1
650 SUPPOSITORY RECTAL EVERY 6 HOURS PRN
Status: DISCONTINUED | OUTPATIENT
Start: 2022-12-11 | End: 2022-12-14 | Stop reason: HOSPADM

## 2022-12-11 RX ADMIN — CEFTRIAXONE 1000 MG: 1 INJECTION, POWDER, FOR SOLUTION INTRAMUSCULAR; INTRAVENOUS at 16:45

## 2022-12-11 RX ADMIN — SODIUM CHLORIDE 1000 ML: 9 INJECTION, SOLUTION INTRAVENOUS at 12:27

## 2022-12-11 RX ADMIN — MORPHINE SULFATE 4 MG: 4 INJECTION, SOLUTION INTRAMUSCULAR; INTRAVENOUS at 12:27

## 2022-12-11 RX ADMIN — ONDANSETRON 4 MG: 4 TABLET, ORALLY DISINTEGRATING ORAL at 12:27

## 2022-12-11 RX ADMIN — IOPAMIDOL 50 ML: 755 INJECTION, SOLUTION INTRAVENOUS at 15:00

## 2022-12-11 ASSESSMENT — PAIN SCALES - GENERAL
PAINLEVEL_OUTOF10: 3
PAINLEVEL_OUTOF10: 0
PAINLEVEL_OUTOF10: 5
PAINLEVEL_OUTOF10: 8

## 2022-12-11 ASSESSMENT — LIFESTYLE VARIABLES
HOW MANY STANDARD DRINKS CONTAINING ALCOHOL DO YOU HAVE ON A TYPICAL DAY: PATIENT DOES NOT DRINK
HOW OFTEN DO YOU HAVE A DRINK CONTAINING ALCOHOL: NEVER
HOW MANY STANDARD DRINKS CONTAINING ALCOHOL DO YOU HAVE ON A TYPICAL DAY: PATIENT DOES NOT DRINK
HOW OFTEN DO YOU HAVE A DRINK CONTAINING ALCOHOL: NEVER

## 2022-12-11 ASSESSMENT — PAIN DESCRIPTION - ORIENTATION: ORIENTATION: LEFT

## 2022-12-11 ASSESSMENT — PAIN DESCRIPTION - DESCRIPTORS
DESCRIPTORS: ACHING;SHARP;SHOOTING
DESCRIPTORS: CRAMPING
DESCRIPTORS: ACHING;DISCOMFORT;TENDER

## 2022-12-11 ASSESSMENT — PAIN - FUNCTIONAL ASSESSMENT
PAIN_FUNCTIONAL_ASSESSMENT: 0-10
PAIN_FUNCTIONAL_ASSESSMENT: PREVENTS OR INTERFERES SOME ACTIVE ACTIVITIES AND ADLS

## 2022-12-11 ASSESSMENT — PAIN DESCRIPTION - LOCATION
LOCATION: ABDOMEN

## 2022-12-11 ASSESSMENT — PAIN DESCRIPTION - PAIN TYPE
TYPE: ACUTE PAIN
TYPE: ACUTE PAIN

## 2022-12-11 ASSESSMENT — PAIN DESCRIPTION - FREQUENCY: FREQUENCY: INTERMITTENT

## 2022-12-11 ASSESSMENT — PAIN DESCRIPTION - ONSET: ONSET: ON-GOING

## 2022-12-11 NOTE — ED NOTES
[facility] called for patient transport  ETA will be 3-4hrs     Eris Arnold RN  12/11/22 [id number]

## 2022-12-11 NOTE — ED NOTES
Nurse to nurse report called to the floor, spoke to [address] [place] [address] rn. Patient's test results reviewed, and vitals signs reported to the receiving nurse. And any and all other important information regarding the patient disclosed.         Hero Bowens RN  12/11/22 [id number]

## 2022-12-11 NOTE — LETTER
[facility]  [address], [place] [address]  Phone: [phone]    No name on file. December 14, 2022     Patient: Kaci Wong   YOB: 1980   Date of Visit: 12/11/2022-12/15/2022       To Whom It May Concern: It is my medical opinion that Kaci Wong remains at light duty at work until follow-up with physician. If you have any questions or concerns, please don't hesitate to call.     Sincerely,        Mandeep Gustafson RN

## 2022-12-11 NOTE — ED PROVIDER NOTES
ED Attending shared visit  CC: No        [address] [facility]  Department of Emergency Medicine   ED  Encounter Note  Admit Date/RoomTime: 2022 11:20 AM  ED Room:     NAME: Pallavi Landaverde  : 1980  MRN: [id number]     Chief Complaint:  Abdominal Pain (Abdominal pain since Tuesday night, + nausea and vomiting/)    History of Present Illness        Pallavi Landaverde is a 40 y.o. old female who presents to the emergency department by private vehicle, for abdominal pain, nausea, and vomiting. Symptoms started Tuesday. States that she is able to keep a little bit of fluids down. Patient went to [facility] Urgent care today and was told to come to the ED for further testing and treatment. ROS   Pertinent positives and negatives are stated within HPI, all other systems reviewed and are negative. Past Medical History:  has a past medical history of Anxiety, Asthma, Hydrocephalus ([address] Hydrocephalus ([address] Low iron, and Migraines. Surgical History:  has a past surgical history that includes shunt externalization; Finger surgery; Refractive surgery (Bilateral); and Dental surgery. Social History:  reports that she has never smoked. She has never used smokeless tobacco. She reports that she does not currently use alcohol. She reports that she does not currently use drugs after having used the following drugs: Marijuana Laly Greg). Family History: family history includes Cancer in her paternal grandfather; Depression in her father and mother; Mental Illness in her sister. Allergies: Aspirin and Adhesive tape    Physical Exam   Oxygen Saturation Interpretation: Normal.        ED Triage Vitals [22 1036]   BP Temp Temp src Heart Rate Resp SpO2 Height Weight   139/83 97.7 °F (36.5 °C) -- 68 16 98 % 5' 6\" (1.676 m) 181 lb (82.1 kg)       Physical Exam  General Appearance/Constitutional:  Alert, development consistent with age. HEENT:  NC/NT. PERRLA. Airway patent. Neck:  Supple. No lymphadenopathy. Respiratory: Lungs Clear to auscultation and breath sounds equal.  CV:  Regular rate and rhythm. GI:  normal appearing, non-distended with no visible hernias. Bowel sounds: normal bowel sounds. Tenderness: There is moderate tenderness present - located in the LUQ, and in the LLQ. .    Back: CVA Tenderness: moderate tenderness present bilaterally left worse than the right  Integument:  Normal turgor. Warm, dry, without visible rash, unless noted elsewhere. Neurological:  Orientation age-appropriate. Motor functions intact.     Lab / Imaging Results   (All laboratory and radiology results have been personally reviewed by myself)  Labs:  Results for orders placed or performed during the hospital encounter of 12/11/22   CBC with Auto Differential   Result Value Ref Range    WBC 12.7 (H) 4.5 - 11.5 E9/L    RBC 4.31 3.50 - 5.50 E12/L    Hemoglobin 12.4 11.5 - 15.5 g/dL    Hematocrit 37.4 34.0 - 48.0 %    MCV 86.8 80.0 - 99.9 fL    MCH 28.8 26.0 - 35.0 pg    MCHC 33.2 32.0 - 34.5 %    RDW 12.4 11.5 - 15.0 fL    Platelets 239 216 - 208 E9/L    MPV 10.1 7.0 - 12.0 fL    Neutrophils % 91.4 (H) 43.0 - 80.0 %    Immature Granulocytes % 0.5 0.0 - 5.0 %    Lymphocytes % 5.7 (L) 20.0 - 42.0 %    Monocytes % 2.3 2.0 - 12.0 %    Eosinophils % 0.0 0.0 - 6.0 %    Basophils % 0.1 0.0 - 2.0 %    Neutrophils Absolute 11.65 (H) 1.80 - 7.30 E9/L    Immature Granulocytes # 0.06 E9/L    Lymphocytes Absolute 0.73 (L) 1.50 - 4.00 E9/L    Monocytes Absolute 0.29 0.10 - 0.95 E9/L    Eosinophils Absolute 0.00 (L) 0.05 - 0.50 E9/L    Basophils Absolute 0.01 0.00 - 0.20 E9/L   Comprehensive Metabolic Panel w/ Reflex to MG   Result Value Ref Range    Sodium 140 132 - 146 mmol/L    Potassium reflex Magnesium 3.4 (L) 3.5 - 5.0 mmol/L    Chloride 102 98 - 107 mmol/L    CO2 26 22 - 29 mmol/L    Anion Gap 12 7 - 16 mmol/L    Glucose 143 (H) 74 - 99 mg/dL    BUN 10 6 - 20 mg/dL    Creatinine 1.0 0.5 - 1.0 mg/dL Est, Glom Filt Rate >60 >=60 mL/min/1.73    Calcium 9.7 8.6 - 10.2 mg/dL    Total Protein 7.8 6.4 - 8.3 g/dL    Albumin 4.6 3.5 - 5.2 g/dL    Total Bilirubin 0.3 0.0 - 1.2 mg/dL    Alkaline Phosphatase 54 35 - 104 U/L    ALT 16 0 - 32 U/L    AST 17 0 - 31 U/L   Lipase   Result Value Ref Range    Lipase 33 13 - 60 U/L   Lactic Acid   Result Value Ref Range    Lactic Acid 0.9 0.5 - 2.2 mmol/L   Urinalysis   Result Value Ref Range    Color, UA Yellow Straw/Yellow    Clarity, UA SL CLOUDY Clear    Glucose, Ur Negative Negative mg/dL    Bilirubin Urine SMALL (A) Negative    Ketones, Urine Negative Negative mg/dL    Specific Gravity, UA >=1.030 1.005 - 1.030    Blood, Urine LARGE (A) Negative    pH, UA 5.0 5.0 - 9.0    Protein, UA TRACE Negative mg/dL    Urobilinogen, Urine 0.2 <2.0 E.U./dL    Nitrite, Urine Negative Negative    Leukocyte Esterase, Urine TRACE (A) Negative   Pregnancy, Urine   Result Value Ref Range    HCG(Urine) Pregnancy Test NEGATIVE NEGATIVE   Microscopic Urinalysis   Result Value Ref Range    WBC, UA >20 (A) 0 - 5 /HPF    RBC, UA 1-3 0 - 2 /HPF    Epithelial Cells, UA MANY /HPF    Bacteria, UA FEW (A) None Seen /HPF    Crystals, UA Few (A) None Seen /HPF   Magnesium   Result Value Ref Range    Magnesium 1.7 1.6 - 2.6 mg/dL     Imaging: All Radiology results interpreted by Radiologist unless otherwise noted. CT ABDOMEN PELVIS W IV CONTRAST Additional Contrast? None   Final Result   Hydronephrosis and edema in the left kidney due to a 5 mm obstructing stone   in the left UPJ. A large loculated fluid collection/cystic mass in the pelvis surrounding the   uterus which may be due to fluid collection with the  shunt versus a cystic   mass/cystic neoplasm of the ovary. A cystic mass in the left ovary measuring   2.5 cm is also noted. Since there are no prior examination for comparison,   further assessment is indicated. Consider ultrasonography or cytology   assessment as needed. ED Course / Medical Decision Making     Medications   0.9 % sodium chloride bolus (0 mLs IntraVENous Stopped 12/11/22 1319)   ondansetron (ZOFRAN-ODT) disintegrating tablet 4 mg (4 mg Oral Given 12/11/22 1227)   morphine sulfate (PF) injection 4 mg (4 mg IntraVENous Given 12/11/22 1227)   iopamidol (ISOVUE-370) 76 % injection 50 mL (50 mLs IntraVENous Given 12/11/22 1500)   cefTRIAXone (ROCEPHIN) 1,000 mg in sterile water 10 mL IV syringe (1,000 mg IntraVENous Given 12/11/22 1645)        Re-Evaluations:  12/11/22      Time: 1450    Patients condition is improving after treatment. Patient resting comfortably at this time, states pain is improved after pain medication. MDM:    Patient arrives to the ED for abdominal pain, nausea, and vomiting. Symptoms started Tuesday. States that she is able to keep a little bit of fluids down. Patient went to [facility] Urgent care today and was told to come to the ED for further testing and treatment. Denies chest pain and shortness of breath. Patient states that she does have a history of hydrocephalus and her shunt is not functioning. Lungs clear to auscultation in all lobes. Abdomen is normal appearing, non-distended with no visible hernias. Abdominal tenderness noted in the RUQ, in the RLQ, in the LUQ, and in the LLQ. Right and left CVA tenderness is present. Patient ordered IV morphine, IV bolus, oral Zofran, CBC, CMP, lipase, lactic acid, urinalysis, urine pregnancy, and CT abdomen and pelvis. Urinalysis positive for UTI; patient ordered IV rocephin and urine culture. CT results: Hydronephrosis and edema in the left kidney due to a 5 mm obstructing stone in the left UPJ. A large loculated fluid collection/cystic mass in the pelvis surrounding the uterus which may be due to fluid collection with the  shunt versus a cystic mass/cystic neoplasm of the ovary. A cystic mass in the left ovary measuring 2.5 cm is also noted.   Since there are no prior examination for comparison, further assessment is indicated. Consider ultrasonography or cytology assessment as needed. Labs resulted and results of labs and CT reviewed with patient. Patient is agreeable to admission to [address] [place] [address] contacted to facilitate admission. Dr. Yulisa Macias accepted patient for admission to [address] [place] [address].  Patient examined by Dr. Rasmussen Sheets of Care/Counseling:  ESPERANZA Fernandez CNP and Nurse Practitioner on duty  reviewed today's visit with the patient in addition to providing specific details for the plan of care and counseling regarding the diagnosis and prognosis. Questions are answered at this time and are agreeable with the plan. Assessment      1. Abdominal pain, unspecified abdominal location    2. Abdominal mass, unspecified abdominal location    3. Kidney stone      This patient's ED course included: a personal history and physicial examination  This patient has remained hemodynamically stable during their ED course. Plan   Inpatient Admission to General Medical Floor  Patient condition is stable. New Medications     Discharge Medication List as of 12/11/2022  8:03 PM        Electronically signed by ESPERANZA Rolon CNP   DD: 12/11/22  **This report was transcribed using voice recognition software. Every effort was made to ensure accuracy; however, inadvertent computerized transcription errors may be present.   END OF PROVIDER NOTE          ESPERANZA Pichadro CNP  12/11/22 4749

## 2022-12-12 ENCOUNTER — ANESTHESIA EVENT (OUTPATIENT)
Dept: OPERATING ROOM | Age: 42
DRG: 660 | End: 2022-12-12
Payer: COMMERCIAL

## 2022-12-12 ENCOUNTER — APPOINTMENT (OUTPATIENT)
Dept: ULTRASOUND IMAGING | Age: 42
DRG: 660 | End: 2022-12-12
Attending: INTERNAL MEDICINE
Payer: COMMERCIAL

## 2022-12-12 LAB
ALBUMIN SERPL-MCNC: 3.4 G/DL (ref 3.5–5.2)
ALP BLD-CCNC: 38 U/L (ref 35–104)
ALT SERPL-CCNC: 13 U/L (ref 0–32)
ANION GAP SERPL CALCULATED.3IONS-SCNC: 10 MMOL/L (ref 7–16)
AST SERPL-CCNC: 13 U/L (ref 0–31)
BASOPHILS ABSOLUTE: 0.02 E9/L (ref 0–0.2)
BASOPHILS RELATIVE PERCENT: 0.3 % (ref 0–2)
BILIRUB SERPL-MCNC: 0.3 MG/DL (ref 0–1.2)
BUN BLDV-MCNC: 6 MG/DL (ref 6–20)
CALCIUM SERPL-MCNC: 8.8 MG/DL (ref 8.6–10.2)
CHLORIDE BLD-SCNC: 105 MMOL/L (ref 98–107)
CO2: 26 MMOL/L (ref 22–29)
CREAT SERPL-MCNC: 1 MG/DL (ref 0.5–1)
EOSINOPHILS ABSOLUTE: 0.08 E9/L (ref 0.05–0.5)
EOSINOPHILS RELATIVE PERCENT: 1 % (ref 0–6)
GFR SERPL CREATININE-BSD FRML MDRD: >60 ML/MIN/1.73
GLUCOSE BLD-MCNC: 94 MG/DL (ref 74–99)
HCT VFR BLD CALC: 30.5 % (ref 34–48)
HEMOGLOBIN: 9.7 G/DL (ref 11.5–15.5)
IMMATURE GRANULOCYTES #: 0.01 E9/L
IMMATURE GRANULOCYTES %: 0.1 % (ref 0–5)
LYMPHOCYTES ABSOLUTE: 2.59 E9/L (ref 1.5–4)
LYMPHOCYTES RELATIVE PERCENT: 33.5 % (ref 20–42)
MAGNESIUM: 1.8 MG/DL (ref 1.6–2.6)
MCH RBC QN AUTO: 28 PG (ref 26–35)
MCHC RBC AUTO-ENTMCNC: 31.8 % (ref 32–34.5)
MCV RBC AUTO: 87.9 FL (ref 80–99.9)
MONOCYTES ABSOLUTE: 0.63 E9/L (ref 0.1–0.95)
MONOCYTES RELATIVE PERCENT: 8.2 % (ref 2–12)
NEUTROPHILS ABSOLUTE: 4.4 E9/L (ref 1.8–7.3)
NEUTROPHILS RELATIVE PERCENT: 56.9 % (ref 43–80)
PDW BLD-RTO: 12.5 FL (ref 11.5–15)
PLATELET # BLD: 288 E9/L (ref 130–450)
PMV BLD AUTO: 9.9 FL (ref 7–12)
POTASSIUM REFLEX MAGNESIUM: 3 MMOL/L (ref 3.5–5)
RBC # BLD: 3.47 E12/L (ref 3.5–5.5)
SODIUM BLD-SCNC: 141 MMOL/L (ref 132–146)
TOTAL PROTEIN: 5.8 G/DL (ref 6.4–8.3)
WBC # BLD: 7.7 E9/L (ref 4.5–11.5)

## 2022-12-12 PROCEDURE — 36415 COLL VENOUS BLD VENIPUNCTURE: CPT

## 2022-12-12 PROCEDURE — 83735 ASSAY OF MAGNESIUM: CPT

## 2022-12-12 PROCEDURE — 6360000002 HC RX W HCPCS: Performed by: INTERNAL MEDICINE

## 2022-12-12 PROCEDURE — 2060000000 HC ICU INTERMEDIATE R&B

## 2022-12-12 PROCEDURE — 85025 COMPLETE CBC W/AUTO DIFF WBC: CPT

## 2022-12-12 PROCEDURE — 99233 SBSQ HOSP IP/OBS HIGH 50: CPT | Performed by: INTERNAL MEDICINE

## 2022-12-12 PROCEDURE — 76856 US EXAM PELVIC COMPLETE: CPT

## 2022-12-12 PROCEDURE — 2580000003 HC RX 258: Performed by: INTERNAL MEDICINE

## 2022-12-12 PROCEDURE — 6370000000 HC RX 637 (ALT 250 FOR IP): Performed by: INTERNAL MEDICINE

## 2022-12-12 PROCEDURE — 80053 COMPREHEN METABOLIC PANEL: CPT

## 2022-12-12 RX ORDER — POTASSIUM CHLORIDE 20 MEQ/1
40 TABLET, EXTENDED RELEASE ORAL 2 TIMES DAILY WITH MEALS
Status: DISCONTINUED | OUTPATIENT
Start: 2022-12-12 | End: 2022-12-14 | Stop reason: HOSPADM

## 2022-12-12 RX ADMIN — POTASSIUM CHLORIDE 40 MEQ: 1500 TABLET, EXTENDED RELEASE ORAL at 16:25

## 2022-12-12 RX ADMIN — SODIUM CHLORIDE, PRESERVATIVE FREE 10 ML: 5 INJECTION INTRAVENOUS at 21:17

## 2022-12-12 RX ADMIN — PIPERACILLIN AND TAZOBACTAM 3375 MG: 3; .375 INJECTION, POWDER, FOR SOLUTION INTRAVENOUS at 07:44

## 2022-12-12 RX ADMIN — ACETAMINOPHEN 650 MG: 325 TABLET ORAL at 21:17

## 2022-12-12 RX ADMIN — SODIUM CHLORIDE, POTASSIUM CHLORIDE, SODIUM LACTATE AND CALCIUM CHLORIDE: 600; 310; 30; 20 INJECTION, SOLUTION INTRAVENOUS at 00:07

## 2022-12-12 RX ADMIN — SODIUM CHLORIDE, POTASSIUM CHLORIDE, SODIUM LACTATE AND CALCIUM CHLORIDE: 600; 310; 30; 20 INJECTION, SOLUTION INTRAVENOUS at 20:57

## 2022-12-12 RX ADMIN — SODIUM CHLORIDE, PRESERVATIVE FREE 10 ML: 5 INJECTION INTRAVENOUS at 00:09

## 2022-12-12 RX ADMIN — SODIUM CHLORIDE: 9 INJECTION, SOLUTION INTRAVENOUS at 00:13

## 2022-12-12 RX ADMIN — WATER 1000 MG: 1 INJECTION INTRAMUSCULAR; INTRAVENOUS; SUBCUTANEOUS at 16:25

## 2022-12-12 RX ADMIN — POTASSIUM CHLORIDE 40 MEQ: 1500 TABLET, EXTENDED RELEASE ORAL at 10:13

## 2022-12-12 RX ADMIN — PIPERACILLIN AND TAZOBACTAM 3375 MG: 3; .375 INJECTION, POWDER, FOR SOLUTION INTRAVENOUS at 00:17

## 2022-12-12 RX ADMIN — ONDANSETRON 4 MG: 2 INJECTION INTRAMUSCULAR; INTRAVENOUS at 07:43

## 2022-12-12 ASSESSMENT — LIFESTYLE VARIABLES: SMOKING_STATUS: 0

## 2022-12-12 ASSESSMENT — PAIN SCALES - GENERAL: PAINLEVEL_OUTOF10: 4

## 2022-12-12 ASSESSMENT — PAIN DESCRIPTION - LOCATION: LOCATION: HEAD

## 2022-12-12 ASSESSMENT — PAIN DESCRIPTION - DESCRIPTORS: DESCRIPTORS: ACHING

## 2022-12-12 ASSESSMENT — PAIN DESCRIPTION - ORIENTATION: ORIENTATION: LEFT

## 2022-12-12 NOTE — PROGRESS NOTES
[facility] Hospitalist   Progress Note    Admitting Date and Time: 12/11/2022  8:42 PM  Admit Dx: Obstruction of left ureteropelvic junction (UPJ) [N13.5]  Pelvic mass [R19.00]    Subjective:    Patient more comfortable today. Still having some abdominal discomfort that seems diffuse. No fevers. Pelvic ultrasound shows moderate free fluid within the pelvis, on CT it appeared loculated and could represent a peritoneal CSF pseudocyst.  Otherwise it was an unremarkable sonographic evaluation of the uterus and bilateral ovaries with no cyst seen in either ovary. Ultrasound however also commented that there is a catheter fragment coiled within the pelvis not connected to the  shunt catheter seen in her upper abdomen. Patient was questioned at length about her history with the  shunts. She is from [place] and is only been living here since the summer. She states the last  shunt was done around the time she was 4years old. She has seen neurosurgery and neurologist to tell her that the shunt is fine. She believes that the shunt that is inside her was placed as a 4year-old. Patient also states that she was in a hospital in [facility] in January of this year and that a CT scan of the abdomen was done at that time. ROS: denies fever, chills, cp, sob, n/v, HA unless stated above.      potassium chloride  40 mEq Oral BID WC    sodium chloride flush  5-40 mL IntraVENous 2 times per day    enoxaparin  40 mg SubCUTAneous Daily    piperacillin-tazobactam  3,375 mg IntraVENous Q8H     sodium chloride flush, 5-40 mL, PRN  sodium chloride, , PRN  ondansetron, 4 mg, Q8H PRN   Or  ondansetron, 4 mg, Q6H PRN  polyethylene glycol, 17 g, Daily PRN  acetaminophen, 650 mg, Q6H PRN   Or  acetaminophen, 650 mg, Q6H PRN  morphine, 4 mg, Q4H PRN         Objective:    /71   Pulse 64   Temp 97.7 °F (36.5 °C) (Oral)   Resp 16   Ht 5' 6\" (1.676 m)   Wt 180 lb (81.6 kg)   LMP 12/02/2022   SpO2 97%   BMI 29.05 kg/m²   General Appearance: alert and oriented to person, place and time, well developed and well- nourished, in no acute distress  Skin: warm and dry, no rash or erythema  Neck: supple and non-tender without mass, no thyromegaly or thyroid nodules, no cervical lymphadenopathy  Pulmonary/Chest: clear to auscultation bilaterally- no wheezes, rales or rhonchi, normal air movement, no respiratory distress  Cardiovascular: normal rate, regular rhythm, normal S1 and S2, no murmurs, rubs, clicks, or gallops, distal pulses intact, no carotid bruits  Abdomen: soft, non-tender, non-distended, normal bowel sounds, no masses or organomegaly  Extremities: no cyanosis, clubbing or edema      Recent Labs     12/11/22  1426 12/12/22  0244    141   K 3.4* 3.0*    105   CO2 26 26   BUN 10 6   CREATININE 1.0 1.0   GLUCOSE 143* 94   CALCIUM 9.7 8.8       Recent Labs     12/11/22  1358 12/12/22  0244   WBC 12.7* 7.7   RBC 4.31 3.47*   HGB 12.4 9.7*   HCT 37.4 30.5*   MCV 86.8 87.9   MCH 28.8 28.0   MCHC 33.2 31.8*   RDW 12.4 12.5    288   MPV 10.1 9.9       Radiology:   US PELVIS COMPLETE   Final Result   1. Moderate free fluid within the pelvis. On CT, this appears possibly   loculated. This could represent peritoneal CSF pseudocyst.   2. Unremarkable sonographic evaluation of the uterus and bilateral ovaries. 3. There is a catheter fragment coiled within the pelvis. This is not   connected to the ventriculoperitoneal shunt catheter in the upper abdomen. MRI ABDOMEN W WO CONTRAST    (Results Pending)       Assessment:    Principal Problem:    Obstruction of left ureteropelvic junction (UPJ)  Active Problems:    Pelvic mass  Resolved Problems:    * No resolved hospital problems. *      Plan:    1. Left 5 mm UPJ stone with hydronephrosis  Patient seen by urology. Plan is for cystoscopy and possible stenting.   Patient also with likely urinary infection with white blood cell count greater than 20, however many epithelial cells, trace leukocyte esterase, and large blood. She was started on Zosyn, but will change this to ceftriaxone starting tomorrow. Urine culture is currently pending. 2.   Pelvic fluid collection with  shunt catheter fragment coiled in pelvis. This does not seem to be a cystic mass arising from the ovaries at least according to the pelvic ultrasound. Fluid collection is likely related to  shunt, however unsure why there is a separate coil of shunt in the pelvis that is not attached to the pieces in the upper abdomen. I did discuss the case at length with general surgery who will come and consult and help decide with the proper neck step is for the abdomen. After the discussion I ordered a pelvic MRI with and without contrast.    3.   History of  hydrocephalus status post  shunt  Patient recently saw neurology care locally for the first time. She was referred to neurosurgery at that time but has not seen them yet. A CT scan from August of this year showed that they right frontal catheter noted with the tip in the CSF space anteriorly but atrophy and encephalomalacia in the right frontal area. Patient herself believes that this shunt is not functioning but does not remember who told her. Patient sent for referral for  shunt revision. I do not believe that the shunt is in any way affecting her urologic tract, but given the issues we are having at this time I will try to reach out to neurosurgery. Total time spent on this case discussing with consultants and with the patient is well over 45 minutes.     Electronically signed by April Kent MD on 2022 at 3:29 PM

## 2022-12-12 NOTE — CONSULTS
GENERAL SURGERY  CONSULT NOTE  12/12/2022    Physician Consulted: Dr. Misty Ramirez   Reason for Consult: abnormal  shunt       HPI  Chrissy Cervantes is a 40 y.o. female who presents for evaluation of abnormal  shunt on imaging. Patient presented due to N/V and left flank abdominal pain. Has a  shunt that was placed in [place] and states that it has been evaluated locally and states that on last exam it was in place. Review of images show that there is a coherent pelvic piece (without any fragments) that is not connected to the upper abdominal portion.  There s current no peritoneal findings on exam. Pt denies any fever/ chills       Past Medical History:   Diagnosis Date    Anxiety     Asthma     Hydrocephalus ([address]     Hydrocephalus (HCC)     Low iron     Migraines        Past Surgical History:   Procedure Laterality Date    DENTAL SURGERY      all teeth removed 11/21/22    FINGER SURGERY      R 5th finger    REFRACTIVE SURGERY Bilateral     age 1-4 yrs old    SHUNT EXTERNALIZATION      several surgeries in childhood; last one approx 1983       Medications Prior to Admission:    Prior to Admission medications    Not on File       Allergies   Allergen Reactions    Aspirin Headaches, Nausea Only and Other (See Comments)    Adhesive Tape Hives, Itching and Rash       Family History   Problem Relation Age of Onset    Depression Mother     Depression Father     Mental Illness Sister     Cancer Paternal Grandfather        Social History     Tobacco Use    Smoking status: Never    Smokeless tobacco: Never   Substance Use Topics    Alcohol use: Not Currently     Comment: socially    Drug use: Not Currently     Types: Marijuana Shellye Burkitt)         Review of Systems   General ROS: negative  Hematological and Lymphatic ROS: negative  Respiratory ROS: negative  Cardiovascular ROS: negative  Gastrointestinal ROS: positive for - abdominal pain  Genito-Urinary ROS: negative  Musculoskeletal ROS: negative      PHYSICAL EXAM:    Vitals:    12/12/22 0730   BP: 111/71   Pulse: 64   Resp: 16   Temp: 97.7 °F (36.5 °C)   SpO2: 97%       General Appearance:  awake, alert, oriented, in no acute distress  Skin:  Skin color, texture, turgor normal. No rashes or lesions. Head/face:  NCAT  Eyes:  PERRL  Lungs:  No chest wall tenderness. Heart:  Heart regular rate and rhythm  Abdomen:  Soft, mild left flank tender, normal bowel sounds. No bruits, organomegaly or masses. Extremities: pulses present in all extremities    LABS:    CBC  Recent Labs     12/12/22  0244   WBC 7.7   HGB 9.7*   HCT 30.5*        BMP  Recent Labs     12/12/22  0244      K 3.0*      CO2 26   BUN 6   CREATININE 1.0   CALCIUM 8.8     Liver Function  Recent Labs     12/11/22  1426 12/12/22  0244   LIPASE 33  --    BILITOT 0.3 0.3   AST 17 13   ALT 16 13   ALKPHOS 54 38   PROT 7.8 5.8*   LABALBU 4.6 3.4*     No results for input(s): LACTATE in the last 72 hours. No results for input(s): INR, PTT in the last 72 hours. Invalid input(s): PT    RADIOLOGY    US PELVIS COMPLETE    Result Date: 12/12/2022  EXAMINATION: PELVIC ULTRASOUND 12/12/2022 TECHNIQUE: Multiple longitudinal and transverse grayscale images were obtained of the pelvis using transabdominal sonographic probe. COMPARISON: CT abdomen and pelvis 12/11/2022 HISTORY: ORDERING SYSTEM PROVIDED HISTORY: pelvic mass TECHNOLOGIST PROVIDED HISTORY: Reason for exam:->pelvic mass What reading provider will be dictating this exam?->CRC FINDINGS: Measurements: Uterus: 7.0 x 3.3 x 5.6 cm. Endometrial stripe: Approximately 11 mm in thickness. Right Ovary:3.8 x 1.9 x 2.4 cm. Left Ovary: 3.8 x 2.8 x 3.0 cm. Ultrasound Findings: Uterus: Uterus demonstrates normal myometrial echotexture. Endometrial stripe: Endometrial stripe is within normal limits. Right Ovary: Right ovary is within normal limits. Left Ovary:  Left ovary is within normal limits. Free Fluid: Moderate free fluid. Other:  There is a catheter coiled within the pelvis. 1. Moderate free fluid within the pelvis. On CT, this appears possibly loculated. This could represent peritoneal CSF pseudocyst. 2. Unremarkable sonographic evaluation of the uterus and bilateral ovaries. 3. There is a catheter fragment coiled within the pelvis. This is not connected to the ventriculoperitoneal shunt catheter in the upper abdomen. CT ABDOMEN PELVIS W IV CONTRAST Additional Contrast? None    Result Date: 12/11/2022  EXAMINATION: CT OF THE ABDOMEN AND PELVIS WITH CONTRAST 12/11/2022 2:44 pm TECHNIQUE: CT of the abdomen and pelvis was performed with the administration of intravenous contrast. Multiplanar reformatted images are provided for review. Automated exposure control, iterative reconstruction, and/or weight based adjustment of the mA/kV was utilized to reduce the radiation dose to as low as reasonably achievable. COMPARISON: None. HISTORY: ORDERING SYSTEM PROVIDED HISTORY: [facility] pain TECHNOLOGIST PROVIDED HISTORY: Additional Contrast?->None Reason for exam:->ABD pain Decision Support Exception - unselect if not a suspected or confirmed emergency medical condition->Emergency Medical Condition (MA) FINDINGS: The lung bases are normal.  Liver, gallbladder, spleen, pancreas, the adrenals and the right kidney are normal.  There is hydronephrosis and edema in the left kidney due to an obstructing 5 mm stone in the UPJ. Catheters are identified in the upper abdomen probably from the patient. Pelvis. A large irregular loculated fluid collection/cystic mass is identified in the pelvis surrounding the uterus measuring 14 x 12 x 6 cm with some wires. Bladder is partially distended. There is a 2.5 cm left ovarian cystic mass. Colon is normal.  The appendix is normal.  A 1 cm left breast nodule is identified. Please correlate with mammography. Hydronephrosis and edema in the left kidney due to a 5 mm obstructing stone in the left UPJ.  A large loculated fluid collection/cystic mass in the pelvis surrounding the uterus which may be due to fluid collection with the  shunt versus a cystic mass/cystic neoplasm of the ovary. A cystic mass in the left ovary measuring 2.5 cm is also noted. Since there are no prior examination for comparison, further assessment is indicated. Consider ultrasonography or cytology assessment as needed.          ASSESSMENT:  40 y.o. female with abnormal  shunt with possible fluid collection     PLAN:  Disconnection portion of  shunt in pelvis-- images reviewed does not appear o have any fragmented piece   Unknown if this is a fluid collection-- will get MRI assess   Neurosurgery recs   Okay for diet from our POV  NPO @ [facility] for urology   Abx per primary team     Electronically signed by Adan Bellamy DO on 12/12/22 at 5:16 PM EST

## 2022-12-12 NOTE — PROGRESS NOTES
Spoke to physician- cancel IR procedure for now & new consult for ob-gyn.  Notified IR of the cancelled procedure

## 2022-12-12 NOTE — PROGRESS NOTES
Notified MRI screening form is done, pt not sure model number of  shunt & she states records do not exist for it anymore

## 2022-12-12 NOTE — PROGRESS NOTES
Consult for Complex cystic fluid collection vs mass called to Dr. Juliana Arreola.  Landen Correia went to Dr. Mami Wilder

## 2022-12-12 NOTE — CONSULTS
12/12/222  9:01 AM  Service: Urology  Group: [facility] (Teodoro/Manav/Yudith)    Destini Rodriguez  [id number]     Chief Complaint:    Kidney stone    History of Present Illness: The patient is a 40 y.o. female patient who presents with left flank pain,  nausea and vomiting for aprox 6 days. CT showed 5 mm obstructing stone at the left UPJ as well as pelvic fluid collection. She is feeling better now. She has not had a kidney stone in the past. She has not had fever or chills, no hematuria, dysuria. She has had nausea and vomiting. She is now feeling hungry. Her pain is controlled with pain medication. She has a hx of  shunt with multiple revisions as a child. She states she has been told by her neurologist that it is no longer in place and is not working. Past Medical History:   Diagnosis Date    Anxiety     Asthma     Hydrocephalus ([address]     Hydrocephalus ([address]     Low iron     Migraines          Past Surgical History:   Procedure Laterality Date    DENTAL SURGERY      all teeth removed 11/21/22    FINGER SURGERY      R 5th finger    REFRACTIVE SURGERY Bilateral     age 1-4 yrs old    SHUNT EXTERNALIZATION      several surgeries in childhood; last one approx 1983       Medications Prior to Admission:    No medications prior to admission. Allergies:    Aspirin and Adhesive tape    Social History:    reports that she has never smoked. She has never used smokeless tobacco. She reports that she does not currently use alcohol. She reports that she does not currently use drugs after having used the following drugs: Marijuana Farley Fogo). Family History:   Non-contributory to this Urological problem  family history includes Cancer in her paternal grandfather; Depression in her father and mother; Mental Illness in her sister.     Review of Systems:  Constitutional: No Chills or sweats  Respiratory: negative for cough and hemoptysis  Cardiovascular: negative for chest pain and dyspnea  Gastrointestinal: + for left flank pain, nausea and vomiting  Derm: negative for rash and skin lesion(s)  Neurological: + hydrocephalus  Musculoskeletal: negative  Endocrine: negative for diabetic symptoms including polydipsia and polyuria  Psychiatric: negative  : As above in the HPI, otherwise negative  All other reviews are negative    Physical Exam:     Vitals:  /71   Pulse 64   Temp 97.7 °F (36.5 °C) (Oral)   Resp 16   Ht 5' 6\" (1.676 m)   Wt 180 lb (81.6 kg)   LMP 12/02/2022   SpO2 97%   BMI 29.05 kg/m²     General:  Awake, alert, oriented X 3. Cranial deformity, well nourished, well groomed. No apparent distress. HEENT:   Pupils equal, round. No scleral icterus. No conjunctival injection. Normal lips, teeth, and gums. No nasal discharge. Neck:  Supple, no masses. Heart:  Regular rate  Lungs:  No audible wheezing. Respirations symmetric and non-labored. Abdomen:  soft, + CV tenderness, no masses, no organomegaly, no peritoneal signs  Extremities:  No clubbing, cyanosis, or edema  Skin:  Warm and dry, no open lesions or rashes  Neuro: There are no motor or sensory deficits in the 4 quadrant extremities   Rectal: deferred  Genitalia: deferred    Labs:     Recent Labs     12/11/22  1358 12/12/22  0244   WBC 12.7* 7.7   RBC 4.31 3.47*   HGB 12.4 9.7*   HCT 37.4 30.5*   MCV 86.8 87.9   MCH 28.8 28.0   MCHC 33.2 31.8*   RDW 12.4 12.5    288   MPV 10.1 9.9         Recent Labs     12/11/22  1426 12/12/22  0244   CREATININE 1.0 1.0        Latest Reference Range & Units 12/11/22 12:44   Clarity, UA Clear  SL CLOUDY   Glucose, UA Negative mg/dL Negative   Bilirubin, Urine Negative  SMALL !    Ketones, Urine Negative mg/dL Negative   Specific Gravity, UA 1.005 - 1.030  >=1.030   Blood, Urine Negative  LARGE !   pH, UA 5.0 - 9.0  5.0   Protein, UA Negative mg/dL TRACE   Urobilinogen, Urine <2.0 E.U./dL 0.2   Nitrite, Urine Negative  Negative   Leukocyte Esterase, Urine Negative TRACE !   WBC, UA 0 - 5 /HPF >20 ! RBC, UA 0 - 2 /HPF 1-3   Epithelial Cells, UA /HPF MANY   Bacteria, UA None Seen /HPF FEW ! Crystals, UA None Seen /HPF Few ! EXAMINATION:   CT OF THE ABDOMEN AND PELVIS WITH CONTRAST 12/11/2022 2:44 pm       TECHNIQUE:   CT of the abdomen and pelvis was performed with the administration of   intravenous contrast. Multiplanar reformatted images are provided for review. Automated exposure control, iterative reconstruction, and/or weight based   adjustment of the mA/kV was utilized to reduce the radiation dose to as low   as reasonably achievable. COMPARISON:   None. HISTORY:   ORDERING SYSTEM PROVIDED HISTORY: ABD pain   TECHNOLOGIST PROVIDED HISTORY:   Additional Contrast?->None   Reason for exam:->[facility] pain   Decision Support Exception - unselect if not a suspected or confirmed   emergency medical condition->Emergency Medical Condition (MA)       FINDINGS:   The lung bases are normal.  Liver, gallbladder, spleen, pancreas, the   adrenals and the right kidney are normal.  There is hydronephrosis and edema   in the left kidney due to an obstructing 5 mm stone in the UPJ. Catheters   are identified in the upper abdomen probably from the patient. Pelvis. A large irregular loculated fluid collection/cystic mass is   identified in the pelvis surrounding the uterus measuring 14 x 12 x 6 cm with   some wires. Bladder is partially distended. There is a 2.5 cm left ovarian   cystic mass. Colon is normal.  The appendix is normal.  A 1 cm left breast   nodule is identified. Please correlate with mammography. Impression   Hydronephrosis and edema in the left kidney due to a 5 mm obstructing stone   in the left UPJ. A large loculated fluid collection/cystic mass in the pelvis surrounding the   uterus which may be due to fluid collection with the  shunt versus a cystic   mass/cystic neoplasm of the ovary.   A cystic mass in the left ovary measuring   2.5 cm is also noted. Since there are no prior examination for comparison,   further assessment is indicated. Consider ultrasonography or cytology   assessment as needed. Narrative   EXAMINATION:   PELVIC ULTRASOUND       12/12/2022       TECHNIQUE:   Multiple longitudinal and transverse grayscale images were obtained of the   pelvis using transabdominal sonographic probe. COMPARISON:   CT abdomen and pelvis 12/11/2022       HISTORY:   ORDERING SYSTEM PROVIDED HISTORY: pelvic mass   TECHNOLOGIST PROVIDED HISTORY:       Reason for exam:->pelvic mass   What reading provider will be dictating this exam?->CRC       FINDINGS:       Measurements:       Uterus: 7.0 x 3.3 x 5.6 cm. Endometrial stripe: Approximately 11 mm in thickness. Right Ovary:3.8 x 1.9 x 2.4 cm. Left Ovary: 3.8 x 2.8 x 3.0 cm. Ultrasound Findings:       Uterus: Uterus demonstrates normal myometrial echotexture. Endometrial stripe: Endometrial stripe is within normal limits. Right Ovary: Right ovary is within normal limits. Left Ovary:  Left ovary is within normal limits. Free Fluid: Moderate free fluid. Other: There is a catheter coiled within the pelvis. Impression   1. Moderate free fluid within the pelvis. On CT, this appears possibly   loculated. This could represent peritoneal CSF pseudocyst.   2. Unremarkable sonographic evaluation of the uterus and bilateral ovaries. 3. There is a catheter fragment coiled within the pelvis. This is not   connected to the ventriculoperitoneal shunt catheter in the upper abdomen. Narrative   EXAMINATION:   PELVIC ULTRASOUND       12/12/2022       TECHNIQUE:   Multiple longitudinal and transverse grayscale images were obtained of the   pelvis using transabdominal sonographic probe.        COMPARISON:   CT abdomen and pelvis 12/11/2022       HISTORY:   ORDERING SYSTEM PROVIDED HISTORY: pelvic mass   TECHNOLOGIST PROVIDED HISTORY:       Reason for exam:->pelvic mass   What reading provider will be dictating this exam?->CRC       FINDINGS:       Measurements:       Uterus: 7.0 x 3.3 x 5.6 cm. Endometrial stripe: Approximately 11 mm in thickness. Right Ovary:3.8 x 1.9 x 2.4 cm. Left Ovary: 3.8 x 2.8 x 3.0 cm. Ultrasound Findings:       Uterus: Uterus demonstrates normal myometrial echotexture. Endometrial stripe: Endometrial stripe is within normal limits. Right Ovary: Right ovary is within normal limits. Left Ovary:  Left ovary is within normal limits. Free Fluid: Moderate free fluid. Other: There is a catheter coiled within the pelvis. Impression   1. Moderate free fluid within the pelvis. On CT, this appears possibly   loculated. This could represent peritoneal CSF pseudocyst.   2. Unremarkable sonographic evaluation of the uterus and bilateral ovaries. 3. There is a catheter fragment coiled within the pelvis. This is not   connected to the ventriculoperitoneal shunt catheter in the upper abdomen. Narrative   EXAMINATION:   PELVIC ULTRASOUND       12/12/2022       TECHNIQUE:   Multiple longitudinal and transverse grayscale images were obtained of the   pelvis using transabdominal sonographic probe. COMPARISON:   CT abdomen and pelvis 12/11/2022       HISTORY:   ORDERING SYSTEM PROVIDED HISTORY: pelvic mass   TECHNOLOGIST PROVIDED HISTORY:       Reason for exam:->pelvic mass   What reading provider will be dictating this exam?->CRC       FINDINGS:       Measurements:       Uterus: 7.0 x 3.3 x 5.6 cm. Endometrial stripe: Approximately 11 mm in thickness. Right Ovary:3.8 x 1.9 x 2.4 cm. Left Ovary: 3.8 x 2.8 x 3.0 cm. Ultrasound Findings:       Uterus: Uterus demonstrates normal myometrial echotexture. Endometrial stripe: Endometrial stripe is within normal limits.        Right Ovary: Right ovary is within normal limits. Left Ovary:  Left ovary is within normal limits. Free Fluid: Moderate free fluid. Other: There is a catheter coiled within the pelvis. Impression   1. Moderate free fluid within the pelvis. On CT, this appears possibly   loculated. This could represent peritoneal CSF pseudocyst.   2. Unremarkable sonographic evaluation of the uterus and bilateral ovaries. 3. There is a catheter fragment coiled within the pelvis. This is not   connected to the ventriculoperitoneal shunt catheter in the upper abdomen. Assessment/plan:    Left 5 mm UPJ stone with hydronephrosis  Pelvic mass  Urine culture pending    Will need neurosurgery consult  Antibiotics per primary  Continue fluids  Ok for diet today  NPO after midnight  Will plan for cystoscopy, retrograde pyelogram, left stent insertion if cleared by neurosurgery, tomorrow  Explained procedure including risks of bleeding, infection, need for second procedure to pt, she would like to proceed      Luise Scheuermann NP-C  [facility]       Electronically signed by ESPERANZA Sweet CNP on 12/12/2022 at 9:01 AM     I agree with the nurse practitioner assessment and plan. I personally evaluated the patient and made any changes to reflect my impression and plan. OR tomorrow if stable from neurosurgery standpoint.      Merlin Guardado MD

## 2022-12-12 NOTE — PLAN OF CARE
Problem: Discharge Planning  Goal: Discharge to home or other facility with appropriate resources  12/12/2022 0401 by Nathan Del Rio RN  Outcome: Progressing  12/12/2022 0401 by Nathan Del Rio RN  Outcome: Progressing  Flowsheets  Taken 12/11/2022 2300  Discharge to home or other facility with appropriate resources: Identify barriers to discharge with patient and caregiver  Taken 12/11/2022 2234  Discharge to home or other facility with appropriate resources: Identify barriers to discharge with patient and caregiver     Problem: Pain  Goal: Verbalizes/displays adequate comfort level or baseline comfort level  12/12/2022 0401 by Nathan Del Rio RN  Outcome: Progressing  12/12/2022 0401 by Nathan Del Rio RN  Outcome: Progressing     Problem: Safety - Adult  Goal: Free from fall injury  12/12/2022 0401 by Nathan Del Rio RN  Outcome: Progressing  12/12/2022 0401 by Nathan Del Rio RN  Outcome: Progressing

## 2022-12-12 NOTE — H&P
[facility] History and Physical    --------------------------------------------------------------------------------------  Assessment / Plan      Past Medical History:   Diagnosis Date    Anxiety     Asthma     Hydrocephal[address] ([facility]     Hydrocephalus ([facility] [address]     Low iron     Migraines      Left hydronephrosis  Nephrolithiasis  Consult urology  Start IV hydration  Start IV Zosyn    Pelvic cyst/fluid collection  Will order pelvic ultrasound to better evaluate the adnexal structures  Consult IR for possible drainage  Keep on antibiotic for infection ruled out    Hydrocephalus  Status post  shunt  Stable    Hypokalemia  We will replace      Please see orders for further plan of care  Code status  full  DVT prophylaxis Lovenox  Disposition  Anticipate home  --------------------------------------------------------------------------------------    Admission Date  12/11/2022  8:42 PM  Chief Complaint left-sided abdominal pain  No chief complaint on file. Subjective  History of Present Illness    Patient is 71-year-old  female has history of hydrocephalus status post  shunt as a child presented to emergency room earlier today with left-sided abdominal pain for the last 5 days. Pain is been constant for the last 5 days rated at 3-4 over 10 sometimes gets worse up to 8/10 no clear worsening factors no clear relieving factors. She has been nauseated vomited couple times over the last 5 days. Has some chills but no documented fever. No urinary symptoms denies vaginal discharge or excessive vaginal bleeding. Emergency room revealed left-sided hydronephrosis with obstructive uropathy.   It also showed pelvic fluid collection versus a cystic mass around 10 cm in diameter where the  shunt catheter ends      Review of Systems - 12-point review of systems has been reviewed and is otherwise negative except as listed in the HPI    Past Medical History:   Diagnosis Date    Anxiety     Asthma Hydrocephalus ([address]     Hydrocephalus ([facility])     Low iron     Migraines      Past Surgical History:   Procedure Laterality Date    DENTAL SURGERY      all teeth removed 11/21/22    FINGER SURGERY      R 5th finger    REFRACTIVE SURGERY Bilateral     age 1-4 yrs old    SHUNT EXTERNALIZATION      several surgeries in childhood; last one approx 1983     Prior to Admission medications    Not on File     Allergies  Aspirin and Adhesive tape    Social History   reports that she has never smoked. She has never used smokeless tobacco. She reports that she does not currently use alcohol. She reports that she does not currently use drugs after having used the following drugs: Marijuana Filiberto Hail). Family History  family history includes Cancer in her paternal grandfather; Depression in her father and mother; Mental Illness in her sister.     Objective  Physical Exam   Vitals: /88   Pulse 86   Temp 97.7 °F (36.5 °C) (Oral)   Resp 16   Ht 5' 6\" (1.676 m)   Wt 180 lb (81.6 kg)   LMP 12/02/2022   SpO2 99%   BMI 29.05 kg/m²   General: well-developed, well-nourished, no acute distress, cooperative  Skin: generally warm, dry, and intact, with normal color  HEENT: normocephalic, atraumatic, no gross abnormalities  Respiratory: clear to auscultation bilaterally without respiratory distress  Cardiovascular: regular rate and rhythm without murmur / rub / gallop  Abdominal: soft, left flank/left side abdomen and hypogastrium tender, nondistended, normoactive bowel sounds no rigidity no rebound tenderness  Extremities: no obvious edema or deformity  Neurologic: awake, alert, no gross deficits  Psychiatric: normal affect, cooperative    *Available labs, imaging studies, microbiologic studies, cardiac studies have been reviewed    Electronically signed by Diallo Pena MD on 12/11/2022 at 11:23 PM

## 2022-12-12 NOTE — PROGRESS NOTES
Spoke to attending- hold off on neurology consult at this time, physician will speak to neurologist himself.  In process of obtaining records from [facility] in [place]

## 2022-12-12 NOTE — PROGRESS NOTES
Faxed Release of information signed by patient , Order and Face sheet to [facility] for Records from 1/1/22 to Present  Fax  [phone]    Refaxed  to [phone]

## 2022-12-13 ENCOUNTER — APPOINTMENT (OUTPATIENT)
Dept: GENERAL RADIOLOGY | Age: 42
DRG: 660 | End: 2022-12-13
Attending: INTERNAL MEDICINE
Payer: COMMERCIAL

## 2022-12-13 ENCOUNTER — ANESTHESIA (OUTPATIENT)
Dept: OPERATING ROOM | Age: 42
DRG: 660 | End: 2022-12-13
Payer: COMMERCIAL

## 2022-12-13 PROBLEM — N21.1 URETHRAL STONE: Status: ACTIVE | Noted: 2022-12-13

## 2022-12-13 PROBLEM — T85.09XS: Status: ACTIVE | Noted: 2022-12-13

## 2022-12-13 PROBLEM — Z98.2 VP (VENTRICULOPERITONEAL) SHUNT STATUS: Status: ACTIVE | Noted: 2022-12-13

## 2022-12-13 PROBLEM — N13.1 HYDRONEPHROSIS WITH URETERAL STRICTURE, NOT ELSEWHERE CLASSIFIED: Status: ACTIVE | Noted: 2022-12-13

## 2022-12-13 LAB
ALBUMIN SERPL-MCNC: 3.3 G/DL (ref 3.5–5.2)
ALP BLD-CCNC: 37 U/L (ref 35–104)
ALT SERPL-CCNC: 13 U/L (ref 0–32)
ANION GAP SERPL CALCULATED.3IONS-SCNC: 9 MMOL/L (ref 7–16)
AST SERPL-CCNC: 13 U/L (ref 0–31)
BASOPHILS ABSOLUTE: 0.02 E9/L (ref 0–0.2)
BASOPHILS RELATIVE PERCENT: 0.3 % (ref 0–2)
BILIRUB SERPL-MCNC: 0.3 MG/DL (ref 0–1.2)
BUN BLDV-MCNC: 8 MG/DL (ref 6–20)
CALCIUM SERPL-MCNC: 8.9 MG/DL (ref 8.6–10.2)
CHLORIDE BLD-SCNC: 106 MMOL/L (ref 98–107)
CO2: 24 MMOL/L (ref 22–29)
CREAT SERPL-MCNC: 0.9 MG/DL (ref 0.5–1)
EOSINOPHILS ABSOLUTE: 0.14 E9/L (ref 0.05–0.5)
EOSINOPHILS RELATIVE PERCENT: 2.2 % (ref 0–6)
GFR SERPL CREATININE-BSD FRML MDRD: >60 ML/MIN/1.73
GLUCOSE BLD-MCNC: 82 MG/DL (ref 74–99)
HCG(URINE) PREGNANCY TEST: NEGATIVE
HCT VFR BLD CALC: 30.5 % (ref 34–48)
HEMOGLOBIN: 10 G/DL (ref 11.5–15.5)
IMMATURE GRANULOCYTES #: 0.01 E9/L
IMMATURE GRANULOCYTES %: 0.2 % (ref 0–5)
LYMPHOCYTES ABSOLUTE: 2.4 E9/L (ref 1.5–4)
LYMPHOCYTES RELATIVE PERCENT: 38.4 % (ref 20–42)
MCH RBC QN AUTO: 29.4 PG (ref 26–35)
MCHC RBC AUTO-ENTMCNC: 32.8 % (ref 32–34.5)
MCV RBC AUTO: 89.7 FL (ref 80–99.9)
MONOCYTES ABSOLUTE: 0.34 E9/L (ref 0.1–0.95)
MONOCYTES RELATIVE PERCENT: 5.4 % (ref 2–12)
NEUTROPHILS ABSOLUTE: 3.34 E9/L (ref 1.8–7.3)
NEUTROPHILS RELATIVE PERCENT: 53.5 % (ref 43–80)
PDW BLD-RTO: 12.5 FL (ref 11.5–15)
PLATELET # BLD: 263 E9/L (ref 130–450)
PMV BLD AUTO: 10 FL (ref 7–12)
POTASSIUM REFLEX MAGNESIUM: 3.8 MMOL/L (ref 3.5–5)
RBC # BLD: 3.4 E12/L (ref 3.5–5.5)
SODIUM BLD-SCNC: 139 MMOL/L (ref 132–146)
TOTAL PROTEIN: 5.6 G/DL (ref 6.4–8.3)
URINE CULTURE, ROUTINE: NORMAL
WBC # BLD: 6.3 E9/L (ref 4.5–11.5)

## 2022-12-13 PROCEDURE — 6360000002 HC RX W HCPCS: Performed by: INTERNAL MEDICINE

## 2022-12-13 PROCEDURE — 2709999900 HC NON-CHARGEABLE SUPPLY: Performed by: UROLOGY

## 2022-12-13 PROCEDURE — C1769 GUIDE WIRE: HCPCS | Performed by: UROLOGY

## 2022-12-13 PROCEDURE — 6360000002 HC RX W HCPCS

## 2022-12-13 PROCEDURE — 80053 COMPREHEN METABOLIC PANEL: CPT

## 2022-12-13 PROCEDURE — 3600000013 HC SURGERY LEVEL 3 ADDTL 15MIN: Performed by: UROLOGY

## 2022-12-13 PROCEDURE — 85025 COMPLETE CBC W/AUTO DIFF WBC: CPT

## 2022-12-13 PROCEDURE — 7100000011 HC PHASE II RECOVERY - ADDTL 15 MIN: Performed by: UROLOGY

## 2022-12-13 PROCEDURE — 7100000010 HC PHASE II RECOVERY - FIRST 15 MIN: Performed by: UROLOGY

## 2022-12-13 PROCEDURE — 3700000001 HC ADD 15 MINUTES (ANESTHESIA): Performed by: UROLOGY

## 2022-12-13 PROCEDURE — 87088 URINE BACTERIA CULTURE: CPT

## 2022-12-13 PROCEDURE — 2580000003 HC RX 258: Performed by: INTERNAL MEDICINE

## 2022-12-13 PROCEDURE — 2060000000 HC ICU INTERMEDIATE R&B

## 2022-12-13 PROCEDURE — 2580000003 HC RX 258: Performed by: UROLOGY

## 2022-12-13 PROCEDURE — 81025 URINE PREGNANCY TEST: CPT

## 2022-12-13 PROCEDURE — 74420 UROGRAPHY RTRGR +-KUB: CPT

## 2022-12-13 PROCEDURE — BT1F1ZZ FLUOROSCOPY OF LEFT KIDNEY, URETER AND BLADDER USING LOW OSMOLAR CONTRAST: ICD-10-PCS | Performed by: UROLOGY

## 2022-12-13 PROCEDURE — 3600000003 HC SURGERY LEVEL 3 BASE: Performed by: UROLOGY

## 2022-12-13 PROCEDURE — 6370000000 HC RX 637 (ALT 250 FOR IP): Performed by: INTERNAL MEDICINE

## 2022-12-13 PROCEDURE — 3700000000 HC ANESTHESIA ATTENDED CARE: Performed by: UROLOGY

## 2022-12-13 PROCEDURE — 36415 COLL VENOUS BLD VENIPUNCTURE: CPT

## 2022-12-13 PROCEDURE — 0T778DZ DILATION OF LEFT URETER WITH INTRALUMINAL DEVICE, VIA NATURAL OR ARTIFICIAL OPENING ENDOSCOPIC: ICD-10-PCS | Performed by: UROLOGY

## 2022-12-13 PROCEDURE — 99232 SBSQ HOSP IP/OBS MODERATE 35: CPT | Performed by: INTERNAL MEDICINE

## 2022-12-13 PROCEDURE — C2617 STENT, NON-COR, TEM W/O DEL: HCPCS | Performed by: UROLOGY

## 2022-12-13 PROCEDURE — 99222 1ST HOSP IP/OBS MODERATE 55: CPT | Performed by: SURGERY

## 2022-12-13 DEVICE — URETERAL STENT
Type: IMPLANTABLE DEVICE | Site: URETER | Status: FUNCTIONAL
Brand: POLARIS™ ULTRA

## 2022-12-13 RX ORDER — SODIUM CHLORIDE 0.9 % (FLUSH) 0.9 %
5-40 SYRINGE (ML) INJECTION EVERY 12 HOURS SCHEDULED
Status: CANCELLED | OUTPATIENT
Start: 2022-12-13

## 2022-12-13 RX ORDER — MEPERIDINE HYDROCHLORIDE 25 MG/ML
12.5 INJECTION INTRAMUSCULAR; INTRAVENOUS; SUBCUTANEOUS ONCE
Status: CANCELLED | OUTPATIENT
Start: 2022-12-13 | End: 2022-12-13

## 2022-12-13 RX ORDER — MIDAZOLAM HYDROCHLORIDE 1 MG/ML
INJECTION INTRAMUSCULAR; INTRAVENOUS PRN
Status: DISCONTINUED | OUTPATIENT
Start: 2022-12-13 | End: 2022-12-13 | Stop reason: SDUPTHER

## 2022-12-13 RX ORDER — FENTANYL CITRATE 50 UG/ML
25 INJECTION, SOLUTION INTRAMUSCULAR; INTRAVENOUS EVERY 5 MIN PRN
Status: CANCELLED | OUTPATIENT
Start: 2022-12-13

## 2022-12-13 RX ORDER — PROCHLORPERAZINE EDISYLATE 5 MG/ML
5 INJECTION INTRAMUSCULAR; INTRAVENOUS
Status: CANCELLED | OUTPATIENT
Start: 2022-12-13 | End: 2022-12-14

## 2022-12-13 RX ORDER — LABETALOL HYDROCHLORIDE 5 MG/ML
5 INJECTION, SOLUTION INTRAVENOUS
Status: CANCELLED | OUTPATIENT
Start: 2022-12-13

## 2022-12-13 RX ORDER — SODIUM CHLORIDE, SODIUM LACTATE, POTASSIUM CHLORIDE, CALCIUM CHLORIDE 600; 310; 30; 20 MG/100ML; MG/100ML; MG/100ML; MG/100ML
INJECTION, SOLUTION INTRAVENOUS CONTINUOUS
Status: DISCONTINUED | OUTPATIENT
Start: 2022-12-13 | End: 2022-12-14 | Stop reason: HOSPADM

## 2022-12-13 RX ORDER — SODIUM CHLORIDE 0.9 % (FLUSH) 0.9 %
5-40 SYRINGE (ML) INJECTION PRN
Status: CANCELLED | OUTPATIENT
Start: 2022-12-13

## 2022-12-13 RX ORDER — DIPHENHYDRAMINE HYDROCHLORIDE 50 MG/ML
12.5 INJECTION INTRAMUSCULAR; INTRAVENOUS
Status: CANCELLED | OUTPATIENT
Start: 2022-12-13 | End: 2022-12-14

## 2022-12-13 RX ORDER — SODIUM CHLORIDE 9 MG/ML
INJECTION, SOLUTION INTRAVENOUS PRN
Status: CANCELLED | OUTPATIENT
Start: 2022-12-13

## 2022-12-13 RX ORDER — HYDRALAZINE HYDROCHLORIDE 20 MG/ML
5 INJECTION INTRAMUSCULAR; INTRAVENOUS
Status: CANCELLED | OUTPATIENT
Start: 2022-12-13

## 2022-12-13 RX ORDER — PROPOFOL 10 MG/ML
INJECTION, EMULSION INTRAVENOUS PRN
Status: DISCONTINUED | OUTPATIENT
Start: 2022-12-13 | End: 2022-12-13 | Stop reason: SDUPTHER

## 2022-12-13 RX ORDER — FENTANYL CITRATE 50 UG/ML
INJECTION, SOLUTION INTRAMUSCULAR; INTRAVENOUS PRN
Status: DISCONTINUED | OUTPATIENT
Start: 2022-12-13 | End: 2022-12-13 | Stop reason: SDUPTHER

## 2022-12-13 RX ADMIN — FENTANYL CITRATE 50 MCG: 50 INJECTION, SOLUTION INTRAMUSCULAR; INTRAVENOUS at 13:14

## 2022-12-13 RX ADMIN — SODIUM CHLORIDE, POTASSIUM CHLORIDE, SODIUM LACTATE AND CALCIUM CHLORIDE: 600; 310; 30; 20 INJECTION, SOLUTION INTRAVENOUS at 16:25

## 2022-12-13 RX ADMIN — WATER 1000 MG: 1 INJECTION INTRAMUSCULAR; INTRAVENOUS; SUBCUTANEOUS at 16:23

## 2022-12-13 RX ADMIN — POTASSIUM CHLORIDE 40 MEQ: 1500 TABLET, EXTENDED RELEASE ORAL at 16:23

## 2022-12-13 RX ADMIN — MIDAZOLAM 2 MG: 1 INJECTION INTRAMUSCULAR; INTRAVENOUS at 13:10

## 2022-12-13 RX ADMIN — SODIUM CHLORIDE, POTASSIUM CHLORIDE, SODIUM LACTATE AND CALCIUM CHLORIDE: 600; 310; 30; 20 INJECTION, SOLUTION INTRAVENOUS at 06:07

## 2022-12-13 RX ADMIN — FENTANYL CITRATE 50 MCG: 50 INJECTION, SOLUTION INTRAMUSCULAR; INTRAVENOUS at 13:20

## 2022-12-13 RX ADMIN — POTASSIUM CHLORIDE 40 MEQ: 1500 TABLET, EXTENDED RELEASE ORAL at 08:47

## 2022-12-13 RX ADMIN — PROPOFOL 150 MCG/KG/MIN: 10 INJECTION, EMULSION INTRAVENOUS at 13:15

## 2022-12-13 RX ADMIN — PROPOFOL 50 MG: 10 INJECTION, EMULSION INTRAVENOUS at 13:20

## 2022-12-13 RX ADMIN — PROPOFOL 100 MG: 10 INJECTION, EMULSION INTRAVENOUS at 13:14

## 2022-12-13 ASSESSMENT — PAIN SCALES - GENERAL
PAINLEVEL_OUTOF10: 0

## 2022-12-13 NOTE — BRIEF OP NOTE
Brief Postoperative Note      Patient: Vipin Morocho  YOB: 1980  MRN: [id number]    Date of Procedure: 12/13/2022    Pre-Op Diagnosis: left upper ureteral stone    Post-Op Diagnosis: Same       Procedure(s):  CYSTOSCOPY RETROGRADE PYELOGRAM LEFT STENT INSERTION    Surgeon(s):  Miguel Peacock MD    Assistant:      Anesthesia: Monitor Anesthesia Care    Estimated Blood Loss (mL): Minimal    Complications: None    Specimens:   ID Type Source Tests Collected by Time Destination   1 : Urine culture Urine Urine, Cystoscopic CULTURE, URINE Miguel Peacock MD 12/13/2022 1333        Implants:  Implant Name Type Inv.  Item Serial No.  Lot No. LRB No. Used Action   STENT URET 6FR L26CM PERCFLX HYDR+ DBL PGTL THRD 2 - [id number]  Lexii Lover 6FR L26CM PERCFLX HYDR+ DBL PGTL THRD 2  [employer] [facility]- [id number] Left 1 Implanted         Drains:   Ureteral Drain/Stent 12/13/22 Left Ureter (Active)       Findings:     Electronically signed by Miguel Peacock MD on 12/13/2022 at 1:36 PM

## 2022-12-13 NOTE — PROGRESS NOTES
GENERAL SURGERY  DAILY PROGRESS NOTE  12/13/2022    CC: Flank pain      Subjective:  No abdominal tenderness on exam this morning. Was tolerating diet yesterday. N.p.o. for urology yesterday.     Objective:  /84   Pulse 63   Temp 97.5 °F (36.4 °C) (Oral)   Resp 16   Ht 5' 6\" (1.676 m)   Wt 189 lb (85.7 kg)   LMP 12/02/2022   SpO2 98%   BMI 30.51 kg/m²     GENERAL:  Laying in bed, awake, alert, cooperative, no apparent distress  LUNGS:  No increased work of breathing  CARDIOVASCULAR:  RR  ABDOMEN:  Soft, mild flank tenderness, non-distended  EXTREMITIES: No edema or swelling  SKIN: Warm and dry    Assessment/Plan:  40 y.o. female dislodged  drain, flank tenderness    N.p.o. for urology procedure, okay for diet from general surgery point of view afterwards  Still continue to treat medically  Follow-up MRI  Will follow-up with urology's Intra-Op report, IR abscess the patient not need any PEG tube    Electronically signed by Jackie Silva DO on 12/13/2022 at 6:43 AM

## 2022-12-13 NOTE — PROGRESS NOTES
[facility] Progress Note    Admitting Date and Time: 12/11/2022  8:42 PM  Admit Dx: Obstruction of left ureteropelvic junction (UPJ) [N13.5]  Pelvic mass [R19.00]    Subjective:  Patient is being followed for Obstruction of left ureteropelvic junction (UPJ) [N13.5]  Pelvic mass [R19.00]     Seen at bedside. Awake and alert. States she feels better than yesterday. Less pain. Does have nausea. Did not pass stone per patient. Will have surgery today. Tolerating antibiotics, no side effects. ROS: denies fever, chills, cp, sob, n/v, HA unless stated above.       potassium chloride  40 mEq Oral BID WC    cefTRIAXone (ROCEPHIN) IV  1,000 mg IntraVENous Q24H    sodium chloride flush  5-40 mL IntraVENous 2 times per day    enoxaparin  40 mg SubCUTAneous Daily     sodium chloride flush, 5-40 mL, PRN  sodium chloride, , PRN  ondansetron, 4 mg, Q8H PRN   Or  ondansetron, 4 mg, Q6H PRN  polyethylene glycol, 17 g, Daily PRN  acetaminophen, 650 mg, Q6H PRN   Or  acetaminophen, 650 mg, Q6H PRN  morphine, 4 mg, Q4H PRN         Objective:    /73   Pulse 65   Temp 98.2 °F (36.8 °C) (Oral)   Resp 18   Ht 5' 6\" (1.676 m)   Wt 189 lb (85.7 kg)   LMP 12/02/2022   SpO2 97%   BMI 30.51 kg/m²     General Appearance: alert and oriented to person, place and time and in no acute distress  Skin: warm and dry  Head: normocephalic and atraumatic  Eyes: pupils equal, round, and reactive to light, extraocular eye movements intact, conjunctivae normal  Neck: neck supple and non tender without mass   Pulmonary/Chest: clear to auscultation bilaterally- no wheezes, rales or rhonchi, normal air movement, no respiratory distress  Cardiovascular: normal rate, normal S1 and S2 and no carotid bruits  Abdomen: soft, non-tender, non-distended, normal bowel sounds, no masses or organomegaly  Extremities: no cyanosis, no clubbing and no edema  Neurologic: no cranial nerve deficit and speech normal        Recent Labs 22  1426 22  0244 22  0227    141 139   K 3.4* 3.0* 3.8    105 106   CO2 26 26 24   BUN 10 6 8   CREATININE 1.0 1.0 0.9   GLUCOSE 143* 94 82   CALCIUM 9.7 8.8 8.9       Recent Labs     22  1358 22  0244 227   WBC 12.7* 7.7 6.3   RBC 4.31 3.47* 3.40*   HGB 12.4 9.7* 10.0*   HCT 37.4 30.5* 30.5*   MCV 86.8 87.9 89.7   MCH 28.8 28.0 29.4   MCHC 33.2 31.8* 32.8   RDW 12.4 12.5 12.5    288 263   MPV 10.1 9.9 10.0       Radiology: reviewed     Assessment:    Principal Problem:    Obstruction of left ureteropelvic junction (UPJ)  Active Problems:    Pelvic mass  Resolved Problems:    * No resolved hospital problems. *      Plan:  1. Left 5 mm UPJ stone with hydronephrosis  Patient seen by urology. Plan is for cystoscopy and possible stenting. Patient also with likely urinary infection with white blood cell count greater than 20, however many epithelial cells, trace leukocyte esterase, and large blood. Continue on Rocephin and LR @ 100 mL/hour     NPO for urology - cystoscopy, retrograde pyelogram, left stent insertion if cleared by neurosurgery TODAY   will require a second procedure via ureteroscopy with laser lithotripsy once her infection has cleared  Follow intraop report and urine culture      2. Pelvic fluid collection with  shunt catheter fragment coiled in pelvis. This does not seem to be a cystic mass arising from the ovaries at least according to the pelvic ultrasound. Fluid collection is likely related to  shunt, however unsure why there is a separate coil of shunt in the pelvis that is not attached to the pieces in the upper abdomen. After discussion with general surgery, MRI ordered. However, not completed due to concern over metal/coils in abdomen. 3.   History of  hydrocephalus status post  shunt  Patient recently saw neurology care locally for the first time.   She was referred to neurosurgery at that time but has not seen them yet. A CT scan from August of this year showed that they right frontal catheter noted with the tip in the CSF space anteriorly but atrophy and encephalomalacia in the right frontal area. Patient herself believes that this shunt is not functioning but does not remember who told her. Patient sent for referral for  shunt revision. Should follow up with neurosurgery outpatient     CODE: full  DVT prophylaxis: Lovenox   Discharge dispo: home likely tomorrow after surgery done today. 30 minutes time spent reviewing patient chart, assessing patient, discussing plan of care with patient and family, discussing plan of care with collaborating physician, and charting.      Electronically signed by ESPERANZA Hayes NP on 12/13/2022 at 9:35 AM

## 2022-12-13 NOTE — PROGRESS NOTES
12/13/2022 11:16 AM  Pallavi Landaverde  [id number]    Subjective:    +nausea  Has not passed the stone to her knowledge   +left flank pain   No fevers  No family present     Review of Systems  Constitutional: No fever or chills   Respiratory: negative for cough and hemoptysis  Cardiovascular: negative for chest pain and dyspnea  Gastrointestinal: negative for abdominal pain, diarrhea, nausea and vomiting   : See above  Derm: negative for rash and skin lesion(s)  Neurological: negative for seizures and tremors  Musculoskeletal: Negative    Psychiatric: Negative   All other reviews are negative      Scheduled Meds:   potassium chloride  40 mEq Oral BID WC    cefTRIAXone (ROCEPHIN) IV  1,000 mg IntraVENous Q24H    sodium chloride flush  5-40 mL IntraVENous 2 times per day    enoxaparin  40 mg SubCUTAneous Daily       Objective:  Vitals:    12/13/22 0845   BP: 106/73   Pulse: 65   Resp: 18   Temp: 98.2 °F (36.8 °C)   SpO2: 97%         Allergies: Aspirin and Adhesive tape    General Appearance: alert and oriented to person, place and time and in no acute distress  Skin: no rash or erythema  Head: normocephalic and atraumatic  Pulmonary/Chest: normal air movement, no respiratory distress  Abdomen: soft, non-tender, non-distended  Genitourinary: no freeman   Extremities: no cyanosis, clubbing or edema         Labs:     Recent Labs     12/13/22  0227      K 3.8      CO2 24   BUN 8   CREATININE 0.9   GLUCOSE 82   CALCIUM 8.9       Lab Results   Component Value Date/Time    HGB 10.0 12/13/2022 02:27 AM    HCT 30.5 12/13/2022 02:27 AM       No results found for: PSA      Assessment/Plan:  5mm left UPJ stone, left hydronephrosis     Creatinine stable   Urine culture pending  Antibiotics per primary  Keep NPO   Will proceed to OR today for cystoscopy, retrograde pyelogram, left stent insertion   She Is aware she will require a second procedure via ureteroscopy with laser lithotripsy once her infection has cleared  Consented to the above  Will proceed to OR this afternoon    ESPERANZA Reece - CNP   [facility]  Urology

## 2022-12-13 NOTE — ANESTHESIA PRE PROCEDURE
Department of Anesthesiology  Preprocedure Note       Name:  Piter Guzman   Age:  40 y.o.  :  1980                                          MRN:  [id number]         Date:  2022      Surgeon: Casey Peng):  Deonte Roe MD    Procedure: Procedure(s):  CYSTOSCOPY RETROGRADE PYELOGRAM LEFT STENT INSERTION   (STAFF FROM ROOM 9)    Medications prior to admission:   Prior to Admission medications    Not on File       Current medications:    Current Facility-Administered Medications   Medication Dose Route Frequency Provider Last Rate Last Admin    potassium chloride (KLOR-CON M) extended release tablet 40 mEq  40 mEq Oral BID WC Elma Freed MD   40 mEq at 22 1625    cefTRIAXone (ROCEPHIN) 1,000 mg in sterile water 10 mL IV syringe  1,000 mg IntraVENous Q24H Elma Freed MD   1,000 mg at 22 1625    sodium chloride flush 0.9 % injection 5-40 mL  5-40 mL IntraVENous 2 times per day Jovany Grove MD   10 mL at 22 0009    sodium chloride flush 0.9 % injection 5-40 mL  5-40 mL IntraVENous PRN Inocencia Cool MD        0.9 % sodium chloride infusion   IntraVENous PRN Jovany Grove MD 12.5 mL/hr at 22 0013 New Bag at 22 0013    enoxaparin (LOVENOX) injection 40 mg  40 mg SubCUTAneous Daily Inocencia Cool MD        ondansetron (ZOFRAN-ODT) disintegrating tablet 4 mg  4 mg Oral Q8H PRN Inocencia Cool MD        Or    ondansetron (ZOFRAN) injection 4 mg  4 mg IntraVENous Q6H PRN Jovany Grove MD   4 mg at 22 0743    polyethylene glycol (GLYCOLAX) packet 17 g  17 g Oral Daily PRN Inocencia Cool MD        acetaminophen (TYLENOL) tablet 650 mg  650 mg Oral Q6H PRN Inocencia Cool MD        Or    acetaminophen (TYLENOL) suppository 650 mg  650 mg Rectal Q6H PRN Inocencia Cool MD        lactated ringers infusion   IntraVENous Continuous Inocencia Cool  mL/hr at 22 0007 New Bag at 22 000    morphine sulfate (PF) injection 4 mg 4 mg IntraVENous Q4H PRN Inocencia Rojas MD           Allergies: Allergies   Allergen Reactions    Aspirin Headaches, Nausea Only and Other (See Comments)    Adhesive Tape Hives, Itching and Rash       Problem List:    Patient Active Problem List   Diagnosis Code    Encephalomalacia G93.89    Hydrocephalus ([address] G91.9    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    Pelvic mass R19.00       Past Medical History:        Diagnosis Date    Anxiety     Asthma     Hydrocephalus ([address]     Hydrocephalus ([address]     Low iron     Migraines        Past Surgical History:        Procedure Laterality Date    DENTAL SURGERY      all teeth removed 11/21/22    FINGER SURGERY      R 5th finger    REFRACTIVE SURGERY Bilateral     age 1-4 yrs old    SHUNT EXTERNALIZATION      several surgeries in childhood; last one approx 27       Social History:    Social History     Tobacco Use    Smoking status: Never    Smokeless tobacco: Never   Substance Use Topics    Alcohol use: Not Currently     Comment: socially                                Counseling given: Not Answered      Vital Signs (Current):   Vitals:    12/11/22 2230 12/12/22 0730   BP: 128/88 111/71   Pulse: 86 64   Resp: 16 16   Temp: 97.7 °F (36.5 °C) 97.7 °F (36.5 °C)   TempSrc: Oral Oral   SpO2: 99% 97%   Weight: 180 lb (81.6 kg)    Height: 5' 6\" (1.676 m)                                               BP Readings from Last 3 Encounters:   12/12/22 111/71   12/11/22 126/70   11/28/22 110/73       NPO Status:                                                                                 BMI:   Wt Readings from Last 3 Encounters:   12/11/22 180 lb (81.6 kg)   12/11/22 181 lb (82.1 kg)   11/28/22 188 lb (85.3 kg)     Body mass index is 29.05 kg/m².     CBC:   Lab Results   Component Value Date/Time    WBC 7.7 12/12/2022 02:44 AM    RBC 3.47 12/12/2022 02:44 AM    HGB 9.7 12/12/2022 02:44 AM    HCT 30.5 12/12/2022 02:44 AM    MCV 87.9 12/12/2022 02:44 AM    RDW 12.5 12/12/2022 02:44 AM     12/12/2022 02:44 AM       CMP:   Lab Results   Component Value Date/Time     12/12/2022 02:44 AM    K 3.0 12/12/2022 02:44 AM     12/12/2022 02:44 AM    CO2 26 12/12/2022 02:44 AM    BUN 6 12/12/2022 02:44 AM    CREATININE 1.0 12/12/2022 02:44 AM    GFRAA >60 08/20/2022 05:36 PM    LABGLOM >60 12/12/2022 02:44 AM    GLUCOSE 94 12/12/2022 02:44 AM    PROT 5.8 12/12/2022 02:44 AM    CALCIUM 8.8 12/12/2022 02:44 AM    BILITOT 0.3 12/12/2022 02:44 AM    ALKPHOS 38 12/12/2022 02:44 AM    AST 13 12/12/2022 02:44 AM    ALT 13 12/12/2022 02:44 AM       POC Tests: No results for input(s): POCGLU, POCNA, POCK, POCCL, POCBUN, POCHEMO, POCHCT in the last 72 hours.     Coags: No results found for: PROTIME, INR, APTT    HCG (If Applicable):   Lab Results   Component Value Date    PREGTESTUR NEGATIVE 12/11/2022        ABGs: No results found for: PHART, PO2ART, XDL6KOB, NYZ2ITW, BEART, F2CIXPGC     Type & Screen (If Applicable):  No results found for: LABABO, LABRH    Drug/Infectious Status (If Applicable):  No results found for: HIV, HEPCAB    COVID-19 Screening (If Applicable): No results found for: COVID19      CT:  Impression   Hydronephrosis and edema in the left kidney due to a 5 mm obstructing stone   in the left UPJ.       A large loculated fluid collection/cystic mass in the pelvis surrounding the   uterus which may be due to fluid collection with the  shunt versus a cystic   mass/cystic neoplasm of the ovary.  A cystic mass in the left ovary measuring   2.5 cm is also noted.  Since there are no prior examination for comparison,   further assessment is indicated.  Consider ultrasonography or cytology   assessment as needed.               Anesthesia Evaluation  Patient summary reviewed and Nursing notes reviewed no history of anesthetic complications:   Airway: Mallampati: III  TM distance: >3 FB   Neck ROM: full  Mouth opening: > = 3 FB   Dental:    (+) edentulous      Pulmonary:normal exam  breath sounds clear to auscultation  (+) asthma (Mild intermittent - denies recent exacerbations):     (-) sleep apnea and not a current smoker                           Cardiovascular:  Exercise tolerance: good (>4 METS),       (-) past MI, CAD, CABG/stent, dysrhythmias and  angina    ECG reviewed  Rhythm: regular  Rate: normal           Beta Blocker:  Not on Beta Blocker      ROS comment: EKG:  Normal sinus rhythm  Possible Anterior infarct , age undetermined  Abnormal ECG  No previous ECGs available     Neuro/Psych:   (+) headaches: migraine headaches, depression/anxiety    (-) seizures, TIA and CVA            ROS comment: Encephalomalacia  Hydrocephalus -  Shunt GI/Hepatic/Renal:   (+) renal disease (Obstruction of left ureteropelvic junction ): kidney stones,      (-) hepatitis       Endo/Other:    (+) blood dyscrasia (Hbg 10.0 & Hct 30.5): anemia:., electrolyte abnormalities (Hypokalemia), .    (-) diabetes mellitus        Pt had no PAT visit        ROS comment: Pelvic mass Abdominal:   (+) obese,           Vascular: negative vascular ROS. - DVT and PE. Other Findings:           Anesthesia Plan      MAC     ASA 3       Induction: intravenous. MIPS: Postoperative opioids intended and Prophylactic antiemetics administered. Anesthetic plan and risks discussed with patient. Plan discussed with CRNA. Tavo Ba MD   12/12/2022    DOS STAFF ADDENDUM:    Patient seen and chart reviewed on DOS. No interval change in history or exam.   I agree with the anesthesia pre-operative assessment written above and have made the appropriate addendums and/or changes. Anesthesia plan discussed and risks/benefits addressed. Patient's concerns and questions answered. NPO >8 hours.      Ned Payan DO  December 13, 2022  1:09 PM

## 2022-12-13 NOTE — PROGRESS NOTES
Spoke to Dr. Ovidio Holloway to notify that MRCP still on hold until MRI receives further information regarding  shunt.  Electronically signed by Ramón Dinero RN on 12/13/2022 at 9:46 AM

## 2022-12-13 NOTE — ANESTHESIA POSTPROCEDURE EVALUATION
Department of Anesthesiology  Postprocedure Note    Patient: Susanna Zavala  MRN: [id number]  [place]: 1980  Date of evaluation: 12/13/2022      Procedure Summary     Date: 12/13/22 Room / Location: [facility] OR 06 / [facility]    Anesthesia Start: 1310 Anesthesia Stop: 1343    Procedure: CYSTOSCOPY RETROGRADE PYELOGRAM LEFT STENT INSERTION (Left: Ureter) Diagnosis:       Kidney stone      (Kidney stone [N20.0])    Surgeons: Klaudia Hernadez MD Responsible Provider: Johnathon Pierce DO    Anesthesia Type: MAC ASA Status: 3          Anesthesia Type: No value filed. Henry Phase I:      Henry Phase II:        Anesthesia Post Evaluation    Patient location during evaluation: PACU  Patient participation: complete - patient participated  Level of consciousness: awake  Pain score: 1  Airway patency: patent  Nausea & Vomiting: no nausea and no vomiting  Complications: no  Cardiovascular status: hemodynamically stable and blood pressure returned to baseline  Respiratory status: acceptable  Hydration status: euvolemic  Comments: Seen and examined. Progressing as expected. No questions or concerns.

## 2022-12-14 VITALS
DIASTOLIC BLOOD PRESSURE: 82 MMHG | HEART RATE: 98 BPM | OXYGEN SATURATION: 98 % | WEIGHT: 189 LBS | SYSTOLIC BLOOD PRESSURE: 132 MMHG | HEIGHT: 66 IN | RESPIRATION RATE: 16 BRPM | TEMPERATURE: 100.2 F | BODY MASS INDEX: 30.37 KG/M2

## 2022-12-14 PROCEDURE — 6370000000 HC RX 637 (ALT 250 FOR IP): Performed by: INTERNAL MEDICINE

## 2022-12-14 PROCEDURE — 99239 HOSP IP/OBS DSCHRG MGMT >30: CPT | Performed by: INTERNAL MEDICINE

## 2022-12-14 PROCEDURE — 99232 SBSQ HOSP IP/OBS MODERATE 35: CPT | Performed by: SURGERY

## 2022-12-14 RX ORDER — CEFDINIR 300 MG/1
300 CAPSULE ORAL 2 TIMES DAILY
Qty: 10 CAPSULE | Refills: 0 | Status: SHIPPED | OUTPATIENT
Start: 2022-12-14 | End: 2022-12-19

## 2022-12-14 RX ADMIN — POTASSIUM CHLORIDE 40 MEQ: 1500 TABLET, EXTENDED RELEASE ORAL at 07:56

## 2022-12-14 NOTE — DISCHARGE SUMMARY
[facility] Physician Discharge Summary       Mike Conteh MD  [address] [facility] [address]          Becky Davis MD  [address] [facility] [place] [address]  [phone]    Call in 1 week(s)        Activity level: As tolerated   Dispo: Home  Condition on discharge: Stable    Patient ID:  Kamlesh Wong  [id number]  40 y.o.  1980    Admit date: 12/11/2022    Discharge date and time:  12/14/2022  2:00 PM    Admission Diagnoses: Principal Problem:    Obstruction of left ureteropelvic junction (UPJ)  Active Problems:    Pelvic mass     (ventriculoperitoneal) shunt status    Urethral stone    Hydronephrosis with ureteral stricture, not elsewhere classified    Obstructed  shunt, sequela  Resolved Problems:    * No resolved hospital problems. *      Discharge Diagnoses: Principal Problem:    Obstruction of left ureteropelvic junction (UPJ)  Active Problems:    Pelvic mass     (ventriculoperitoneal) shunt status    Urethral stone    Hydronephrosis with ureteral stricture, not elsewhere classified    Obstructed  shunt, sequela  Resolved Problems:    * No resolved hospital problems. *      Consults:  IP CONSULT TO UROLOGY  IP CONSULT TO NEUROSURGERY  [facility]:   Patient Kamlesh Wong is a 40 y.o. presented with Obstruction of left ureteropelvic junction (UPJ) [N13.5]  Pelvic mass [R19.00]    Patient is 50-year-old  female has history of hydrocephalus status post  shunt as a child presented to emergency room earlier today with left-sided abdominal pain for the last 5 days. Pain is been constant for the last 5 days rated at 3-4 over 10 sometimes gets worse up to 8/10 no clear worsening factors no clear relieving factors. She has been nauseated vomited couple times over the last 5 days. Has some chills but no documented fever. No urinary symptoms denies vaginal discharge or excessive vaginal bleeding.  Emergency room revealed left-sided hydronephrosis with obstructive uropathy. It also showed pelvic fluid collection versus a cystic mass around 10 cm in diameter where the  shunt catheter ends. Issues during hospitalization as follows. 1.   Left 5 mm UPJ stone with hydronephrosis  S/p Cystopanendoscopy, retrograde pyelogram, and  stent placement    Patient also with likely urinary infection with white blood cell count greater than 20, however many epithelial cells, trace leukocyte esterase, and large blood. will require a second procedure via ureteroscopy with laser lithotripsy once her infection has cleared    For cefdnir x 5 days outpatient. 2.   Pelvic fluid collection with  shunt catheter fragment coiled in pelvis. This does not seem to be a cystic mass arising from the ovaries at least according to the pelvic ultrasound. Fluid collection is likely related to  shunt, however unsure why there is a separate coil of shunt in the pelvis that is not attached to the pieces in the upper abdomen. After discussion with general surgery, MRI ordered. However, not completed due to concern over metal/coils in abdomen. 3.   History of  hydrocephalus status post  shunt  Patient recently saw neurology care locally for the first time. She was referred to neurosurgery at that time but has not seen them yet. A CT scan from August of this year showed that they right frontal catheter noted with the tip in the CSF space anteriorly but atrophy and encephalomalacia in the right frontal area. Patient herself believes that this shunt is not functioning but does not remember who told her. Patient sent for referral for  shunt revision. Should follow up with neurosurgery outpatient    Patient is hemodynamically and medically stable for discharge. Follow up with PCP, urology, and neurosurgery.      Discharge Exam:    General Appearance: alert and oriented to person, place and time and in no acute distress  Skin: warm and dry  Head: normocephalic and atraumatic  Eyes: pupils equal, round, and reactive to light, extraocular eye movements intact, conjunctivae normal  Neck: neck supple and non tender without mass   Pulmonary/Chest: clear to auscultation bilaterally- no wheezes, rales or rhonchi, normal air movement, no respiratory distress  Cardiovascular: normal rate, normal S1 and S2 and no carotid bruits  Abdomen: soft, non-tender, non-distended, normal bowel sounds, no masses or organomegaly  Extremities: no cyanosis, no clubbing and no edema  Neurologic: no cranial nerve deficit and speech normal    I/O last 3 completed shifts: In: 3837.2 [P.O.:920; I.V.:2917.2]  Out: 2175 [Urine:2175]  No intake/output data recorded. LABS:  Recent Labs     12/11/22  1426 12/12/22  0244 12/13/22 0227    141 139   K 3.4* 3.0* 3.8    105 106   CO2 26 26 24   BUN 10 6 8   CREATININE 1.0 1.0 0.9   GLUCOSE 143* 94 82   CALCIUM 9.7 8.8 8.9       Recent Labs     12/12/22  0244 12/13/22 0227   WBC 7.7 6.3   RBC 3.47* 3.40*   HGB 9.7* 10.0*   HCT 30.5* 30.5*   MCV 87.9 89.7   MCH 28.0 29.4   MCHC 31.8* 32.8   RDW 12.5 12.5    263   MPV 9.9 10.0       No results for input(s): POCGLU in the last 72 hours. Imaging:  US PELVIS COMPLETE    Result Date: 12/12/2022  EXAMINATION: PELVIC ULTRASOUND 12/12/2022 TECHNIQUE: Multiple longitudinal and transverse grayscale images were obtained of the pelvis using transabdominal sonographic probe. COMPARISON: CT abdomen and pelvis 12/11/2022 HISTORY: ORDERING SYSTEM PROVIDED HISTORY: pelvic mass TECHNOLOGIST PROVIDED HISTORY: Reason for exam:->pelvic mass What reading provider will be dictating this exam?->CRC FINDINGS: Measurements: Uterus: 7.0 x 3.3 x 5.6 cm. Endometrial stripe: Approximately 11 mm in thickness. Right Ovary:3.8 x 1.9 x 2.4 cm. Left Ovary: 3.8 x 2.8 x 3.0 cm. Ultrasound Findings: Uterus: Uterus demonstrates normal myometrial echotexture.  Endometrial stripe: Endometrial stripe is within normal limits. Right Ovary: Right ovary is within normal limits. Left Ovary:  Left ovary is within normal limits. Free Fluid: Moderate free fluid. Other: There is a catheter coiled within the pelvis. 1. Moderate free fluid within the pelvis. On CT, this appears possibly loculated. This could represent peritoneal CSF pseudocyst. 2. Unremarkable sonographic evaluation of the uterus and bilateral ovaries. 3. There is a catheter fragment coiled within the pelvis. This is not connected to the ventriculoperitoneal shunt catheter in the upper abdomen. CT ABDOMEN PELVIS W IV CONTRAST Additional Contrast? None    Result Date: 12/11/2022  EXAMINATION: CT OF THE ABDOMEN AND PELVIS WITH CONTRAST 12/11/2022 2:44 pm TECHNIQUE: CT of the abdomen and pelvis was performed with the administration of intravenous contrast. Multiplanar reformatted images are provided for review. Automated exposure control, iterative reconstruction, and/or weight based adjustment of the mA/kV was utilized to reduce the radiation dose to as low as reasonably achievable. COMPARISON: None. HISTORY: ORDERING SYSTEM PROVIDED HISTORY: ABD pain TECHNOLOGIST PROVIDED HISTORY: Additional Contrast?->None Reason for exam:->ABD pain Decision Support Exception - unselect if not a suspected or confirmed emergency medical condition->Emergency Medical Condition (MA) FINDINGS: The lung bases are normal.  Liver, gallbladder, spleen, pancreas, the adrenals and the right kidney are normal.  There is hydronephrosis and edema in the left kidney due to an obstructing 5 mm stone in the UPJ. Catheters are identified in the upper abdomen probably from the patient. Pelvis. A large irregular loculated fluid collection/cystic mass is identified in the pelvis surrounding the uterus measuring 14 x 12 x 6 cm with some wires. Bladder is partially distended. There is a 2.5 cm left ovarian cystic mass.   Colon is normal.  The appendix is normal.  A 1 cm left breast nodule is identified. Please correlate with mammography. Hydronephrosis and edema in the left kidney due to a 5 mm obstructing stone in the left UPJ. A large loculated fluid collection/cystic mass in the pelvis surrounding the uterus which may be due to fluid collection with the  shunt versus a cystic mass/cystic neoplasm of the ovary. A cystic mass in the left ovary measuring 2.5 cm is also noted. Since there are no prior examination for comparison, further assessment is indicated. Consider ultrasonography or cytology assessment as needed.        Patient Instructions:      Medication List        START taking these medications      cefdinir 300 MG capsule  Commonly known as: OMNICEF  Take 1 capsule by mouth 2 times daily for 5 days            STOP taking these medications      gabapentin 100 MG capsule  Commonly known as: NEURONTIN     ibuprofen 800 MG tablet  Commonly known as: ADVIL;MOTRIN               Where to Get Your Medications        These medications were sent to [facility], [address] [place], [place] [address]AIL[facility] [address] [place] [address] [address] [address]      Phone: [phone]   cefdinir 300 MG capsule       Note that more than 30 minutes was spent in preparing discharge papers, discussing discharge with patient, medication review, etc.    Signed:  Electronically signed by ESPERANZA Maguire NP on 12/14/2022 at 2:00 PM

## 2022-12-14 NOTE — PROGRESS NOTES
12/14/2022 7:16 AM  Service: Urology  Group: [facility] ([facility])    Alaina Daily  [id number]    Chief urologic compliant: 5 mm stone  HPI:  Patient is doing well   No flank pain   Having some frequency  tired    Review of Systems:  Respiratory: negative for cough and hemoptysis  Cardiovascular: negative for chest pain and dyspnea  Gastrointestinal: negative for abdominal pain, diarrhea, nausea and vomiting  Derm: negative for rash and skin lesion(s)  Neurological: negative for seizures and tremors  Endocrine: negative for diabetic symptoms including polydipsia and polyuria  : As above in the HPI, otherwise negative  All other reviews are negative     Allergies: Aspirin and Adhesive tape    Objective:   Vitals:    12/14/22 0545   BP: 120/83   Pulse: 72   Resp: 16   Temp: 99.9 °F (37.7 °C)   SpO2: 97%       Neuro: A/A/O x3  Respiratory: non labored breathing  ABD: soft non-tender, non-distended  : freeman no  Ext: no clubbing, cyanosis, edema    Labs:   Recent Labs     12/11/22  1358 12/12/22  0244 12/13/22  0227   WBC 12.7* 7.7 6.3   RBC 4.31 3.47* 3.40*   HGB 12.4 9.7* 10.0*   HCT 37.4 30.5* 30.5*   MCV 86.8 87.9 89.7   MCH 28.8 28.0 29.4   MCHC 33.2 31.8* 32.8   RDW 12.4 12.5 12.5    288 263   MPV 10.1 9.9 10.0       Recent Labs     12/11/22  1426 12/12/22  0244 12/13/22  0227   CREATININE 1.0 1.0 0.9       Assessment: Alaina Daily 40 y.o. female     POD 1 stent for 5mm left UPJ stone, left hydronephrosis    Plan:    Cont the stent  Antibiotics per primary   Will need a laser litho outpatient for definitive stone management   RBA of the procedure were discusssed  No additional  intervention at present   Please call with further questions or concerns

## 2022-12-14 NOTE — PLAN OF CARE
Problem: Discharge Planning  Goal: Discharge to home or other facility with appropriate resources  12/14/2022 0857 by Tanika Guerrier RN  Outcome: Progressing     Problem: Pain  Goal: Verbalizes/displays adequate comfort level or baseline comfort level  12/14/2022 0857 by Tanika Guerrier RN  Outcome: Progressing       Problem: Safety - Adult  Goal: Free from fall injury  12/14/2022 0857 by Tanika Guerrier RN  Outcome: Progressing

## 2022-12-14 NOTE — OP NOTE
[id number] [address]                                OPERATIVE REPORT    PATIENT NAME: Morteza Wood                     :        1980  MED REC NO:   [id number]                            ROOM:       07  ACCOUNT NO:   [de-identified]                           ADMIT DATE: 2022  PROVIDER:     Sally Tay MD    DATE OF PROCEDURE:  2022    PREOPERATIVE DIAGNOSIS:  Left upper ureteral calculus. POSTOPERATIVE DIAGNOSIS:  Left upper ureteral calculus. PROCEDURES PERFORMED:  Cystopanendoscopy, retrograde pyelogram, and  stent placement. SURGEON:  Sally Tay M.D.    ESTIMATED BLOOD LOSS:  Less than 50 mL. ANESTHESIA:  LMAC. DESCRIPTION OF PROCEDURE:  With the patient in the lithotomy position  under satisfactory sedation, the genitalia were prepped and draped in a  sterile fashion. A 21-Arabic panendoscope did not pass easily into the  bladder. I was able to dilate her urethra allowing placement of the  panendoscope. Following which, inspection revealed the trigone to be  symmetrical.  The ureteral orifices with normal configuration and  location. Following inspection, retrograde on her right side proved to  be unremarkable. On her left side, the ureter was normal up to the  upper ureter, where a stone was seen. The stone appeared to be  impacted. I was able to get around the stone with some difficulty using  a _____ wire, following which I was able to place a 26-cm, 6-Arabic  double-J stent; one end curled in the renal pelvis and the other in the  bladder. The bladder was drained. The patient was sent to the recovery  room in satisfactory condition.         Enrique Marie MD    D: 2022 13:42:28       T: 2022 13:45:13     [id number]  Job#: [id number]     Doc#: [id number]    CC:

## 2022-12-14 NOTE — PROGRESS NOTES
Chief complaint  shunt eval  I have examined the patient, performed key aspects of physical exam, and reviewed the record (including all pertinent and new radiology images and laboratory findings). GENERAL SURGERY PROGRESS NOTE    1 Day Post-Op    Subjective    No events overnight. Underwent cystoscopy with stent yesterday with urology. No abdominal pain. Scheduled medications:   potassium chloride  40 mEq Oral BID WC    cefTRIAXone (ROCEPHIN) IV  1,000 mg IntraVENous Q24H    sodium chloride flush  5-40 mL IntraVENous 2 times per day    enoxaparin  40 mg SubCUTAneous Daily       PRN medications:   sodium chloride flush, sodium chloride, ondansetron **OR** ondansetron, polyethylene glycol, acetaminophen **OR** acetaminophen, morphine      Objective    Vitals:    12/13/22 2015 12/14/22 0545 12/14/22 0700 12/14/22 1200   BP: 134/84 120/83 132/82 132/82   Pulse: 77 72 95 98   Resp: 18 16 16 16   Temp: 97.9 °F (36.6 °C) 99.9 °F (37.7 °C) 99.5 °F (37.5 °C) 100.2 °F (37.9 °C)   TempSrc: Oral Oral Oral Oral   SpO2: 97% 97%  98%   Weight:       Height:           I/O last 3 completed shifts: In: 3837.2 [P.O.:920; I.V.:2917.2]  Out: 2175 [Urine:2175]     Gen: NAD  Resp: Breathing Comfortably  CV: Reg Rate  Abd: Soft, ND, No R/G       Labs:    CBC  Recent Labs     12/13/22 0227   WBC 6.3   HGB 10.0*   HCT 30.5*        BMP  Recent Labs     12/13/22 0227      K 3.8      CO2 24   BUN 8   CREATININE 0.9   CALCIUM 8.9     Liver Function  Recent Labs     12/11/22  1426 12/12/22  0244 12/13/22 0227   LIPASE 33  --   --    BILITOT 0.3   < > 0.3   AST 17   < > 13   ALT 16   < > 13   ALKPHOS 54   < > 37   PROT 7.8   < > 5.6*   LABALBU 4.6   < > 3.3*    < > = values in this interval not displayed. No results for input(s): LACTATE in the last 72 hours. No results for input(s): INR, PTT in the last 72 hours.     Invalid input(s): PT    Imaging:    US PELVIS COMPLETE    Result Date: 12/12/2022  EXAMINATION: PELVIC ULTRASOUND 12/12/2022 TECHNIQUE: Multiple longitudinal and transverse grayscale images were obtained of the pelvis using transabdominal sonographic probe. COMPARISON: CT abdomen and pelvis 12/11/2022 HISTORY: ORDERING SYSTEM PROVIDED HISTORY: pelvic mass TECHNOLOGIST PROVIDED HISTORY: Reason for exam:->pelvic mass What reading provider will be dictating this exam?->CRC FINDINGS: Measurements: Uterus: 7.0 x 3.3 x 5.6 cm. Endometrial stripe: Approximately 11 mm in thickness. Right Ovary:3.8 x 1.9 x 2.4 cm. Left Ovary: 3.8 x 2.8 x 3.0 cm. Ultrasound Findings: Uterus: Uterus demonstrates normal myometrial echotexture. Endometrial stripe: Endometrial stripe is within normal limits. Right Ovary: Right ovary is within normal limits. Left Ovary:  Left ovary is within normal limits. Free Fluid: Moderate free fluid. Other: There is a catheter coiled within the pelvis. 1. Moderate free fluid within the pelvis. On CT, this appears possibly loculated. This could represent peritoneal CSF pseudocyst. 2. Unremarkable sonographic evaluation of the uterus and bilateral ovaries. 3. There is a catheter fragment coiled within the pelvis. This is not connected to the ventriculoperitoneal shunt catheter in the upper abdomen. CT ABDOMEN PELVIS W IV CONTRAST Additional Contrast? None    Result Date: 12/11/2022  EXAMINATION: CT OF THE ABDOMEN AND PELVIS WITH CONTRAST 12/11/2022 2:44 pm TECHNIQUE: CT of the abdomen and pelvis was performed with the administration of intravenous contrast. Multiplanar reformatted images are provided for review. Automated exposure control, iterative reconstruction, and/or weight based adjustment of the mA/kV was utilized to reduce the radiation dose to as low as reasonably achievable. COMPARISON: None.  HISTORY: ORDERING SYSTEM PROVIDED HISTORY: ABD pain TECHNOLOGIST PROVIDED HISTORY: Additional Contrast?->None Reason for exam:->ABD pain Decision Support Exception - unselect if not a suspected or confirmed emergency medical condition->Emergency Medical Condition (MA) FINDINGS: The lung bases are normal.  Liver, gallbladder, spleen, pancreas, the adrenals and the right kidney are normal.  There is hydronephrosis and edema in the left kidney due to an obstructing 5 mm stone in the UPJ. Catheters are identified in the upper abdomen probably from the patient. Pelvis. A large irregular loculated fluid collection/cystic mass is identified in the pelvis surrounding the uterus measuring 14 x 12 x 6 cm with some wires. Bladder is partially distended. There is a 2.5 cm left ovarian cystic mass. Colon is normal.  The appendix is normal.  A 1 cm left breast nodule is identified. Please correlate with mammography. Hydronephrosis and edema in the left kidney due to a 5 mm obstructing stone in the left UPJ. A large loculated fluid collection/cystic mass in the pelvis surrounding the uterus which may be due to fluid collection with the  shunt versus a cystic mass/cystic neoplasm of the ovary. A cystic mass in the left ovary measuring 2.5 cm is also noted. Since there are no prior examination for comparison, further assessment is indicated. Consider ultrasonography or cytology assessment as needed. Assessment/Plan  40 y.o. female admitted 12/11/2022 with disconnected  shunt coiled in the pelvis with surrounding fluid collection. Doubt this is of any acute significance. The shunt was placed when she was 4years old. She says she has an appointment next month with neurosurgery to evaluate her shunt. Nothing anything needs to be done during the hospitalization she can follow-up with them to decide whether revision is necessary or not. Candi Austin MD  12/14/22  1:58 PM    NOTE: This report, in part or full, may have been transcribed using voice recognition software.  Every effort was made to ensure accuracy; however, inadvertent computerized transcription errors may be present. Please excuse any transcriptional grammatical or spelling errors that may have escaped my editorial review.

## 2022-12-14 NOTE — DISCHARGE INSTRUCTIONS
A ureteral stent was inserted during your recent procedure. Unlike a heart \"stent\" which is metal, short, and permanent, this ureteral stent plastic, and temporary. It spans from your kidney, down the ureter, and into your bladder. This will need to be removed, so it is very important that you follow-up with your doctor. IMPORTANT - This ureteral stent will likely cause you to experience frequent urination, urgency to urinate, back/flank pain with urination, and/or blood in the urine. These things are very normal.  Taking the pain medications and/or anti-inflammatories will help to manage this discomfort if present. If you have any questions or concerns you can contact [facility] Urology office at [phone]. Ureteral Stent Placement: What to Expect at [address] [facility]     A ureteral (say \"you-REE-ter-ul\") stent is a thin, hollow tube that is placed in the ureter to help urine pass from the kidney into the bladder. Ureters are the tubes that connect the kidneys to the bladder. You may have a small amount of blood in your urine for 1 to 3 days after the procedure. While the stent is in place, you may have to urinate more often, feel a sudden need to urinate, or feel like you can't completely empty your bladder. You may feel some pain when you urinate or do strenuous activity. You also may notice a small amount of blood in your urine after strenuous activities. These side effects usually don't prevent people from doing their normal daily activities. You may have a thin string coming out of your urethra. Your urethra is the tube that carries urine from your bladder to outside your body. This string is attached to the stent. Try not to pull on the string. It will be used to pull out the stent when you no longer need it. After the procedure, urine may flow better from your kidneys to your bladder. A ureteral stent may be left in place for several days or for as long as several months.  Your doctor will take it out when you no longer need it. Or, in some cases, it may be taken out at home. This care sheet gives you a general idea about how long it will take for you to recover. But each person recovers at a different pace. Follow the steps below to get better as quickly as possible. How can you care for yourself at home? Activity    Rest when you feel tired. Getting enough sleep will help you recover. Avoid strenuous activities, such as bicycle riding, jogging, weight lifting, or aerobic exercise, until your doctor says it is okay. Ask your doctor when you can drive again. Most people are able to return to work the day after the procedure. If your work requires intense activity, you may feel pain in your kidney area or get tired easily. If this happens, you may need to do less strenuous activities while the stent is in. Ask your doctor when it is okay for you to have sex. Diet    You can eat your normal diet. If your stomach is upset, try bland, low-fat foods like plain rice, broiled chicken, toast, and yogurt. Drink plenty of fluids (unless your doctor tells you not to). Medicines    Your doctor will tell you if and when you can restart your medicines. You will also get instructions about taking any new medicines. If you take aspirin or some other blood thinner, ask your doctor if and when to start taking it again. Make sure that you understand exactly what your doctor wants you to do. Be safe with medicines. Take pain medicines exactly as directed. If the doctor gave you a prescription medicine for pain, take it as prescribed. If you are not taking a prescription pain medicine, ask your doctor if you can take an over-the-counter medicine. If you think your pain medicine is making you sick to your stomach: Take your medicine after meals (unless your doctor has told you not to). Ask your doctor for a different pain medicine.      If your doctor prescribed antibiotics, take them as directed. Do not stop taking them just because you feel better. You need to take the full course of antibiotics. Follow-up care is a key part of your treatment and safety. Be sure to make and go to all appointments, and call your doctor if you are having problems. It's also a good idea to know your test results and keep a list of the medicines you take. When should you call for help? Call 911 anytime you think you may need emergency care. For example, call if:    You passed out (lost consciousness). You have severe trouble breathing. You have sudden chest pain and shortness of breath, or you cough up blood. You have severe belly pain. Call your doctor now or seek immediate medical care if:    Part or all of the stent comes out of your urethra. You have pain that does not get better after you take pain medicine. You have symptoms of a urinary infection. For example: You have blood or pus in your urine. You have pain in your back just below your rib cage. This is called flank pain. You have a fever, chills, or body aches. It hurts to urinate. You have groin or belly pain. You cannot control when you urinate, or you leak urine. Watch closely for changes in your health, and be sure to contact your doctor if you have any problems. Where can you learn more? Go to [email or web address]://[email or web address] org and sign in to your [employer] account. Enter P287 in the [facility] box to learn more about \"Ureteral Stent Placement: What to Expect at Home. \"     If you do not have an account, please click on the \"Sign Up Now\" link. Current as of: February 10, 2021               Content Version: 12.9  © 0443-6984 [employer], Incorporated. Care instructions adapted under license by [facility] ([facility]).  If you have questions about a medical condition or this instruction, always ask your healthcare professional. [facility] [address] any warranty or liability for your use of this information.

## 2022-12-15 NOTE — PROGRESS NOTES
CLINICAL PHARMACY NOTE: MEDS TO BEDS    Total # of Prescriptions Filled: 1   The following medications were delivered to the patient:  Cefdinir 300    Additional Documentation:

## 2022-12-16 LAB — URINE CULTURE, ROUTINE: NORMAL

## 2023-01-10 ENCOUNTER — PREP FOR PROCEDURE (OUTPATIENT)
Dept: UROLOGY | Age: 43
End: 2023-01-10

## 2023-01-10 RX ORDER — SODIUM CHLORIDE 0.9 % (FLUSH) 0.9 %
5-40 SYRINGE (ML) INJECTION PRN
Status: CANCELLED | OUTPATIENT
Start: 2023-01-10

## 2023-01-10 RX ORDER — SODIUM CHLORIDE 9 MG/ML
INJECTION, SOLUTION INTRAVENOUS CONTINUOUS
Status: CANCELLED | OUTPATIENT
Start: 2023-01-10

## 2023-01-10 RX ORDER — SODIUM CHLORIDE 9 MG/ML
INJECTION, SOLUTION INTRAVENOUS PRN
Status: CANCELLED | OUTPATIENT
Start: 2023-01-10

## 2023-01-10 RX ORDER — SODIUM CHLORIDE 0.9 % (FLUSH) 0.9 %
5-40 SYRINGE (ML) INJECTION EVERY 12 HOURS SCHEDULED
Status: CANCELLED | OUTPATIENT
Start: 2023-01-10

## 2023-01-12 ENCOUNTER — OFFICE VISIT (OUTPATIENT)
Dept: FAMILY MEDICINE CLINIC | Age: 43
End: 2023-01-12
Payer: COMMERCIAL

## 2023-01-12 VITALS
DIASTOLIC BLOOD PRESSURE: 68 MMHG | RESPIRATION RATE: 16 BRPM | TEMPERATURE: 98.1 F | OXYGEN SATURATION: 97 % | BODY MASS INDEX: 28.14 KG/M2 | SYSTOLIC BLOOD PRESSURE: 118 MMHG | WEIGHT: 175.1 LBS | HEIGHT: 66 IN | HEART RATE: 67 BPM

## 2023-01-12 DIAGNOSIS — N21.1 URETHRAL STONE: ICD-10-CM

## 2023-01-12 DIAGNOSIS — N13.5 OBSTRUCTION OF LEFT URETEROPELVIC JUNCTION (UPJ): Primary | ICD-10-CM

## 2023-01-12 DIAGNOSIS — Z09 HOSPITAL DISCHARGE FOLLOW-UP: ICD-10-CM

## 2023-01-12 PROCEDURE — 99213 OFFICE O/P EST LOW 20 MIN: CPT | Performed by: FAMILY MEDICINE

## 2023-01-12 PROCEDURE — 1111F DSCHRG MED/CURRENT MED MERGE: CPT | Performed by: FAMILY MEDICINE

## 2023-01-12 ASSESSMENT — PATIENT HEALTH QUESTIONNAIRE - PHQ9
SUM OF ALL RESPONSES TO PHQ QUESTIONS 1-9: 0
SUM OF ALL RESPONSES TO PHQ QUESTIONS 1-9: 0
SUM OF ALL RESPONSES TO PHQ9 QUESTIONS 1 & 2: 0
1. LITTLE INTEREST OR PLEASURE IN DOING THINGS: 0
2. FEELING DOWN, DEPRESSED OR HOPELESS: 0
SUM OF ALL RESPONSES TO PHQ QUESTIONS 1-9: 0
SUM OF ALL RESPONSES TO PHQ QUESTIONS 1-9: 0

## 2023-01-12 NOTE — PROGRESS NOTES
Post-Discharge Transitional Care  Follow Up      Isabelle Crawley   YOB: 1980    Date of Office Visit:  1/12/2023  Date of Hospital Admission: 12/11/22  Date of Hospital Discharge: 12/14/22  Risk of hospital readmission (high >=14%. Medium >=10%) :Readmission Risk Score: 6.9      Care management risk score Rising risk (score 2-5) and Complex Care (Scores >=6): No Risk Score On File     Non face to face  following discharge, date last encounter closed (first attempt may have been earlier): *No documented post hospital discharge outreach found in the last 14 days    Call initiated 2 business days of discharge: *No response recorded in the last 14 days    ASSESSMENT/PLAN:   Obstruction of left ureteropelvic junction (UPJ)  Urethral stone  Hospital discharge follow-up  -     [place] DISCHARGE MEDS RECONCILED W/ CURRENT OUTPATIENT MED LIST    Medical Decision Making: moderate complexity  Return in 3 months (on 4/12/2023). Subjective:   HPI:  Follow up of Hospital problems/diagnosis(es):   Patient presented to the ED on 12/11 for abdominal pain nausea and vomiting. Patient has CT of the abdomen which did show a 5 mm obstructing stone in the left UPJ. Also showed a large loculated fluid collection/cystic mass in the pelvis surrounding the uterus which may be due to fluid collection from the  shunt versus a cystic mass/cystic neoplasm of the ovary, cystic mass of the left ovary measured 2.5 cm also noted. Urethral stone: Urology was consulted, patient underwent Cystopanendoscopy, retrograde pyelogram, and stent placement Dr. Willard Alfaro. Has appt with urology 1/13/22 for lithotripsy. Pelvic mass: Ultrasound pelvis was done and showed moderate free fluid within the pelvis that could represent a peritoneal CSF pseudocyst.  Unremarkable sonographic evaluation of uterus and bilateral ovaries.   A catheter fragment coiled within the pelvis was noted but does not appear to be connected to the ventriculoperitoneal shunt catheter in the upper abdomen. Inpatient course: Discharge summary reviewed- see chart. Interval history/Current status: Stable, has upcoming procedure with urology. Patient Active Problem List   Diagnosis    Encephalomalacia    Hydrocephalus (HCC)    Obstruction of left ureteropelvic junction (UPJ)    Pelvic mass     (ventriculoperitoneal) shunt status    Urethral stone    Hydronephrosis with ureteral stricture, not elsewhere classified    Obstructed  shunt, sequela       Medications listed as ordered at the time of discharge from hospital     Medication List      as of January 12, 2023  3:48 PM     You have not been prescribed any medications. Medications marked \"taking\" at this time  No outpatient medications have been marked as taking for the 1/12/23 encounter (Office Visit) with Judi Townsend MD.        Medications patient taking as of now reconciled against medications ordered at time of hospital discharge: Yes    A comprehensive review of systems was negative except for what was noted in the HPI. Objective:    /68 (Site: Left Upper Arm, Position: Sitting, Cuff Size: Large Adult)   Pulse 67   Temp 98.1 °F (36.7 °C) (Temporal)   Resp 16   Ht 5' 6\" (1.676 m)   Wt 175 lb 1.6 oz (79.4 kg)   LMP 12/13/2022 (Exact Date)   SpO2 97%   BMI 28.26 kg/m²   Pulmonary/Chest: clear to auscultation bilaterally- no wheezes, rales or rhonchi, normal air movement, no respiratory distress  Cardiovascular: normal rate, normal S1 and S2, no gallops, intact distal pulses, and no carotid bruits  Abdomen: soft, non-tender, non-distended, normal bowel sounds, no masses or organomegaly      An electronic signature was used to authenticate this note.   --Judi Townsend MD

## 2023-01-12 NOTE — PROGRESS NOTES
[facility] PRE-ADMISSION TESTING INSTRUCTIONS    The Preadmission Testing patient is instructed accordingly using the following criteria (check applicable):    ARRIVAL INSTRUCTIONS:  [x] Parking the day of Surgery is located in the Main Entrance lot. Upon entering the door, go to the information desk    [x] Bring photo ID and insurance card    [] Bring in a copy of Living will or Durable Power of  papers. [x] Please be sure to arrange for responsible adult to provide transportation to and from the hospital    [x] Please arrange for responsible adult to be with you for the 24 hour period post procedure due to having anesthesia    [x] If you awake am of surgery not feeling well or have temperature >100 please call [phone]    GENERAL INSTRUCTIONS:    [x] Nothing by mouth after midnight, including gum, candy, mints or water    [x] You may brush your teeth, but do not swallow any water    [] Take medications as instructed with 1-2 oz of water    [x] No herbal supplements and vitamins 5 days prior to procedure    [] Follow preop dosing of blood thinners per physician instructions    [] Take 1/2 dose of evening insulin, but no insulin after midnight    [] No oral diabetic medications after midnight    [] If diabetic and have low blood sugar or feel symptomatic, take 1-2oz apple juice only    [] Bring inhalers day of surgery    [] Bring C-PAP/ Bi-Pap day of surgery    [x] Bring urine specimen day of surgery    [x] Shower or bath with soap, lather and rinse well, AM of Surgery, no lotion, powders or creams to surgical site    [] Follow bowel prep as instructed per surgeon    [x] No tobacco products within 24 hours of surgery     [x] No alcohol or illegal drug use within 24 hours of surgery.     [x] Jewelry, body piercing's, eyeglasses, contact lenses and dentures are not permitted into surgery (bring cases)      [x] Please do not wear any nail polish, make up or hair products on the day of surgery    [x] You can expect a call the business day prior to procedure to notify you if your arrival time changes    [x] If you receive a survey after surgery we would greatly appreciate your comments    [] Parent/guardian of a minor must accompany their child and remain on the premises  the entire time they are under our care     [] Pediatric patients may bring favorite toy, blanket or comfort item with them    [] A caregiver or family member must remain with the patient during their stay if they are mentally handicapped, have dementia, disoriented or unable to use a call light or would be a safety concern if left unattended    [x] Please notify surgeon if you develop any illness between now and time of surgery (cold, cough, sore throat, fever, nausea, vomiting) or any signs of infections  including skin, wounds, and dental.    [x]  The Outpatient Pharmacy is available to fill your prescription here on your day of surgery, ask your preop nurse for details    [] Other instructions    EDUCATIONAL MATERIALS PROVIDED:    [] PAT Preoperative Education Packet/Booklet     [] Medication List    [] Transfusion bracelet applied with instructions    [] Shower with soap, lather and rinse well, and use CHG wipes provided the evening before surgery as instructed    [] Incentive spirometer with instructions

## 2023-01-13 ENCOUNTER — ANESTHESIA EVENT (OUTPATIENT)
Dept: OPERATING ROOM | Age: 43
End: 2023-01-13
Payer: COMMERCIAL

## 2023-01-13 ENCOUNTER — HOSPITAL ENCOUNTER (OUTPATIENT)
Age: 43
Setting detail: OUTPATIENT SURGERY
Discharge: HOME | End: 2023-01-13
Attending: UROLOGY | Admitting: UROLOGY
Payer: COMMERCIAL

## 2023-01-13 ENCOUNTER — HOSPITAL ENCOUNTER (OUTPATIENT)
Dept: GENERAL RADIOLOGY | Age: 43
End: 2023-01-13
Attending: UROLOGY
Payer: COMMERCIAL

## 2023-01-13 ENCOUNTER — ANESTHESIA (OUTPATIENT)
Dept: OPERATING ROOM | Age: 43
End: 2023-01-13
Payer: COMMERCIAL

## 2023-01-13 VITALS
TEMPERATURE: 97.4 F | RESPIRATION RATE: 16 BRPM | WEIGHT: 174 LBS | SYSTOLIC BLOOD PRESSURE: 138 MMHG | DIASTOLIC BLOOD PRESSURE: 79 MMHG | OXYGEN SATURATION: 95 % | HEART RATE: 70 BPM | BODY MASS INDEX: 27.97 KG/M2 | HEIGHT: 66 IN

## 2023-01-13 DIAGNOSIS — R52 PAIN: ICD-10-CM

## 2023-01-13 LAB
HCG, URINE, POC: NEGATIVE
Lab: NORMAL
NEGATIVE QC PASS/FAIL: NORMAL
POSITIVE QC PASS/FAIL: NORMAL

## 2023-01-13 PROCEDURE — 3600000003 HC SURGERY LEVEL 3 BASE: Performed by: UROLOGY

## 2023-01-13 PROCEDURE — 3700000001 HC ADD 15 MINUTES (ANESTHESIA): Performed by: UROLOGY

## 2023-01-13 PROCEDURE — 3600000013 HC SURGERY LEVEL 3 ADDTL 15MIN: Performed by: UROLOGY

## 2023-01-13 PROCEDURE — C1769 GUIDE WIRE: HCPCS | Performed by: UROLOGY

## 2023-01-13 PROCEDURE — C1894 INTRO/SHEATH, NON-LASER: HCPCS | Performed by: UROLOGY

## 2023-01-13 PROCEDURE — 3209999900 FLUORO FOR SURGICAL PROCEDURES

## 2023-01-13 PROCEDURE — 6360000002 HC RX W HCPCS: Performed by: NURSE ANESTHETIST, CERTIFIED REGISTERED

## 2023-01-13 PROCEDURE — 7100000011 HC PHASE II RECOVERY - ADDTL 15 MIN: Performed by: UROLOGY

## 2023-01-13 PROCEDURE — C2617 STENT, NON-COR, TEM W/O DEL: HCPCS | Performed by: UROLOGY

## 2023-01-13 PROCEDURE — 2580000003 HC RX 258: Performed by: NURSE ANESTHETIST, CERTIFIED REGISTERED

## 2023-01-13 PROCEDURE — 2720000010 HC SURG SUPPLY STERILE: Performed by: UROLOGY

## 2023-01-13 PROCEDURE — 6360000004 HC RX CONTRAST MEDICATION: Performed by: UROLOGY

## 2023-01-13 PROCEDURE — 7100000010 HC PHASE II RECOVERY - FIRST 15 MIN: Performed by: UROLOGY

## 2023-01-13 PROCEDURE — 2709999900 HC NON-CHARGEABLE SUPPLY: Performed by: UROLOGY

## 2023-01-13 PROCEDURE — 6360000002 HC RX W HCPCS: Performed by: NURSE PRACTITIONER

## 2023-01-13 PROCEDURE — 2580000003 HC RX 258: Performed by: NURSE PRACTITIONER

## 2023-01-13 PROCEDURE — 3700000000 HC ANESTHESIA ATTENDED CARE: Performed by: UROLOGY

## 2023-01-13 DEVICE — URETERAL STENT
Type: IMPLANTABLE DEVICE | Site: URETER | Status: FUNCTIONAL
Brand: POLARIS™ ULTRA

## 2023-01-13 RX ORDER — SODIUM CHLORIDE 0.9 % (FLUSH) 0.9 %
5-40 SYRINGE (ML) INJECTION EVERY 12 HOURS SCHEDULED
Status: DISCONTINUED | OUTPATIENT
Start: 2023-01-13 | End: 2023-01-13 | Stop reason: HOSPADM

## 2023-01-13 RX ORDER — NITROFURANTOIN 25; 75 MG/1; MG/1
100 CAPSULE ORAL 2 TIMES DAILY
Qty: 10 CAPSULE | Refills: 0 | Status: SHIPPED | OUTPATIENT
Start: 2023-01-13 | End: 2023-01-18

## 2023-01-13 RX ORDER — SODIUM CHLORIDE 9 MG/ML
INJECTION, SOLUTION INTRAVENOUS CONTINUOUS
Status: DISCONTINUED | OUTPATIENT
Start: 2023-01-13 | End: 2023-01-13 | Stop reason: HOSPADM

## 2023-01-13 RX ORDER — PROPOFOL 10 MG/ML
INJECTION, EMULSION INTRAVENOUS CONTINUOUS PRN
Status: DISCONTINUED | OUTPATIENT
Start: 2023-01-13 | End: 2023-01-13 | Stop reason: SDUPTHER

## 2023-01-13 RX ORDER — SODIUM CHLORIDE 0.9 % (FLUSH) 0.9 %
5-40 SYRINGE (ML) INJECTION PRN
Status: DISCONTINUED | OUTPATIENT
Start: 2023-01-13 | End: 2023-01-13 | Stop reason: HOSPADM

## 2023-01-13 RX ORDER — MIDAZOLAM HYDROCHLORIDE 1 MG/ML
INJECTION INTRAMUSCULAR; INTRAVENOUS PRN
Status: DISCONTINUED | OUTPATIENT
Start: 2023-01-13 | End: 2023-01-13 | Stop reason: SDUPTHER

## 2023-01-13 RX ORDER — SODIUM CHLORIDE 9 MG/ML
INJECTION, SOLUTION INTRAVENOUS PRN
Status: DISCONTINUED | OUTPATIENT
Start: 2023-01-13 | End: 2023-01-13 | Stop reason: HOSPADM

## 2023-01-13 RX ORDER — SODIUM CHLORIDE 9 MG/ML
INJECTION, SOLUTION INTRAVENOUS CONTINUOUS PRN
Status: DISCONTINUED | OUTPATIENT
Start: 2023-01-13 | End: 2023-01-13 | Stop reason: SDUPTHER

## 2023-01-13 RX ORDER — FENTANYL CITRATE 50 UG/ML
INJECTION, SOLUTION INTRAMUSCULAR; INTRAVENOUS PRN
Status: DISCONTINUED | OUTPATIENT
Start: 2023-01-13 | End: 2023-01-13 | Stop reason: SDUPTHER

## 2023-01-13 RX ADMIN — FENTANYL CITRATE 100 MCG: 50 INJECTION, SOLUTION INTRAMUSCULAR; INTRAVENOUS at 09:05

## 2023-01-13 RX ADMIN — WATER 2000 MG: 1 INJECTION INTRAMUSCULAR; INTRAVENOUS; SUBCUTANEOUS at 08:51

## 2023-01-13 RX ADMIN — PROPOFOL 40 MG: 10 INJECTION, EMULSION INTRAVENOUS at 08:53

## 2023-01-13 RX ADMIN — PROPOFOL 125 MCG/KG/MIN: 10 INJECTION, EMULSION INTRAVENOUS at 08:52

## 2023-01-13 RX ADMIN — PROPOFOL 20 MG: 10 INJECTION, EMULSION INTRAVENOUS at 08:57

## 2023-01-13 RX ADMIN — MIDAZOLAM 2 MG: 1 INJECTION INTRAMUSCULAR; INTRAVENOUS at 08:45

## 2023-01-13 RX ADMIN — PROPOFOL 40 MG: 10 INJECTION, EMULSION INTRAVENOUS at 09:00

## 2023-01-13 RX ADMIN — SODIUM CHLORIDE: 9 INJECTION, SOLUTION INTRAVENOUS at 08:41

## 2023-01-13 ASSESSMENT — PAIN DESCRIPTION - DESCRIPTORS: DESCRIPTORS: ACHING;DISCOMFORT;SORE

## 2023-01-13 ASSESSMENT — LIFESTYLE VARIABLES: SMOKING_STATUS: 0

## 2023-01-13 ASSESSMENT — PAIN - FUNCTIONAL ASSESSMENT: PAIN_FUNCTIONAL_ASSESSMENT: 0-10

## 2023-01-13 ASSESSMENT — PAIN SCALES - GENERAL: PAINLEVEL_OUTOF10: 0

## 2023-01-13 NOTE — DISCHARGE INSTRUCTIONS
Follow up Dr. Theodora Valerio in 1-4 week, [phone]     A ureteral stent (also know as a double J stent) was inserted during your recent procedure. Unlike a heart \"stent\" which is metal, short, and permanent, this ureteral stent plastic, and temporary. It spans from your kidney, down the ureter, and into your bladder. This will need to be removed either via a procedure in the office or a string in the next week or two. Sometime these stents are left in for long term drainage, but they need to changed every few months. The instructions will be given to you with regards to removal by Dr. Valerio/Manav/Yudith    IMPORTANT - This ureteral stent will likely cause some frequency with urination, urgency with urination, back/flank pain with urination, and/or blood in the urine. This is very normal.  Taking the pain medications and/or anti-inflammatories will help to manage this discomfort if present. If you have any questions or concerns you can contact Dr. Valerio/Manav/Yudith's office at [phone].

## 2023-01-13 NOTE — ANESTHESIA POSTPROCEDURE EVALUATION
Department of Anesthesiology  Postprocedure Note    Patient: Hair Moe  MRN: [id number]  YOB: 1980  Date of evaluation: 1/13/2023      Procedure Summary     Date: 01/13/23 Room / Location: [facility] OR 06 / [facility]    Anesthesia Start: 0923 Anesthesia Stop: 9062    Procedure: CYSTOSCOPY, RETROGRADE PYELOGRAM,URETEROSCOPY, J STENT LASER LITHOTRIPSY WITH LEFT STENT INSERTION (Left: Ureter) Diagnosis:       Ureteral stone      (Ureteral stone [N20.1])    Surgeons: Ervin Valerio DO Responsible Provider: Louie Mccarthy MD    Anesthesia Type: MAC ASA Status: 3          Anesthesia Type: MAC    Henry Phase I: Henry Score: 10    Henry Phase II: Henry Score: 10      Anesthesia Post Evaluation    Patient location during evaluation: PACU  Patient participation: complete - patient participated  Level of consciousness: awake and alert  Pain score: 0  Airway patency: patent  Nausea & Vomiting: no nausea and no vomiting  Complications: no  Cardiovascular status: hemodynamically stable  Respiratory status: spontaneous ventilation and room air  Hydration status: euvolemic  Comments: RN states Pt was discharged to home with no questions or concerns r/t anesthesia care.

## 2023-01-13 NOTE — PROGRESS NOTES
Family called to bedside. Nourishment provided. Call light within reach. stent intact and taped to right thigh.

## 2023-01-13 NOTE — ANESTHESIA PRE PROCEDURE
Department of Anesthesiology  Preprocedure Note       Name:  Isabelle Crawley   Age:  40 y.o.  :  1980                                          MRN:  [id number]         Date:  2023      Surgeon: Hina Hickman):  Mila HILARIO Memo, DO    Procedure: Procedure(s):  CYSTOSCOPY RETROGRADE PYELOGRAM URETEROSCOPY J STENT LASER LITHOTRIPSY LEFT    Medications prior to admission:   Prior to Admission medications    Not on File       Current medications:    No current outpatient medications on file. No current facility-administered medications for this visit. 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 09XS       Past Medical History:        Diagnosis Date    Anxiety     Asthma     Hydrocephalus ([address]     Left ureteral stone     Low iron     Migraines        Past Surgical History:        Procedure Laterality Date    BLADDER SURGERY Left 2022    CYSTOSCOPY RETROGRADE PYELOGRAM LEFT STENT INSERTION performed by Cari Teague MD at [address] [place] [address]      all teeth removed 22    FINGER SURGERY      R 5th finger    REFRACTIVE SURGERY Bilateral     age 1-4 yrs old    SHUNT EXTERNALIZATION      several surgeries in childhood; last one approx 27       Social History:    Social History     Tobacco Use    Smoking status: Never    Smokeless tobacco: Never   Substance Use Topics    Alcohol use: Not Currently     Comment: socially                                Counseling given: Not Answered      Vital Signs (Current): There were no vitals filed for this visit. BP Readings from Last 3 Encounters:   01/12/23 118/68   12/14/22 132/82   12/11/22 126/70       NPO Status:                                                                                 BMI:   Wt Readings from Last 3 Encounters:   01/12/23 174 lb (78.9 kg)   01/12/23 175 lb 1.6 oz (79.4 kg)   12/13/22 189 lb (85.7 kg)     There is no height or weight on file to calculate BMI.    CBC:   Lab Results   Component Value Date/Time    WBC 6.3 12/13/2022 02:27 AM    RBC 3.40 12/13/2022 02:27 AM    HGB 10.0 12/13/2022 02:27 AM    HCT 30.5 12/13/2022 02:27 AM    MCV 89.7 12/13/2022 02:27 AM    RDW 12.5 12/13/2022 02:27 AM     12/13/2022 02:27 AM       CMP:   Lab Results   Component Value Date/Time     12/13/2022 02:27 AM    K 3.8 12/13/2022 02:27 AM     12/13/2022 02:27 AM    CO2 24 12/13/2022 02:27 AM    BUN 8 12/13/2022 02:27 AM    CREATININE 0.9 12/13/2022 02:27 AM    GFRAA >60 08/20/2022 05:36 PM    LABGLOM >60 12/13/2022 02:27 AM    GLUCOSE 82 12/13/2022 02:27 AM    PROT 5.6 12/13/2022 02:27 AM    CALCIUM 8.9 12/13/2022 02:27 AM    BILITOT 0.3 12/13/2022 02:27 AM    ALKPHOS 37 12/13/2022 02:27 AM    AST 13 12/13/2022 02:27 AM    ALT 13 12/13/2022 02:27 AM       POC Tests: No results for input(s): POCGLU, POCNA, POCK, POCCL, POCBUN, POCHEMO, POCHCT in the last 72 hours.     Coags: No results found for: PROTIME, INR, APTT    HCG (If Applicable):   Lab Results   Component Value Date    PREGTESTUR NEGATIVE 12/13/2022        ABGs: No results found for: PHART, PO2ART, OZV3IVT, SMN0TNG, BEART, R7HATTWG     Type & Screen (If Applicable):  No results found for: LABABO, LABRH    Drug/Infectious Status (If Applicable):  No results found for: HIV, HEPCAB    COVID-19 Screening (If Applicable): No results found for: COVID19      CT:  Impression   Hydronephrosis and edema in the left kidney due to a 5 mm obstructing stone   in the left UPJ.       A large loculated fluid collection/cystic mass in the pelvis surrounding the   uterus which may be due to fluid collection with the  shunt versus a cystic   mass/cystic neoplasm of the ovary.  A cystic mass in the left ovary measuring   2.5 cm is also noted.  Since there are no prior examination for comparison,   further assessment is indicated.  Consider ultrasonography or cytology   assessment as needed.               Anesthesia Evaluation  Patient summary reviewed and Nursing notes reviewed no history of anesthetic complications:   Airway: Mallampati: III  TM distance: >3 FB   Neck ROM: full  Mouth opening: > = 3 FB   Dental:    (+) edentulous      Pulmonary: breath sounds clear to auscultation  (+) asthma (Mild intermittent - denies recent exacerbations):     (-) not a current smoker                           Cardiovascular:  Exercise tolerance: good (>4 METS),         ECG reviewed  Rhythm: regular  Rate: normal           Beta Blocker:  Not on Beta Blocker      ROS comment: EKG:  Normal sinus rhythm  Possible Anterior infarct , age undetermined  Abnormal ECG  No previous ECGs available     Neuro/Psych:   (+) headaches: migraine headaches, depression/anxiety              ROS comment: Encephalomalacia  Hydrocephalus -  Shunt GI/Hepatic/Renal:   (+) renal disease (Obstruction of left ureteropelvic junction ): kidney stones,           Endo/Other:    (+) blood dyscrasia (Hbg 10.0 & Hct 30.5): anemia:., .    Electrolyte problem: Hypokalemia. Pt had no PAT visit        ROS comment: Pelvic mass Abdominal:             Vascular: negative vascular ROS. Other Findings:             Anesthesia Plan      MAC     ASA 3       Induction: intravenous. MIPS: Postoperative opioids intended and Prophylactic antiemetics administered. Anesthetic plan and risks discussed with patient. Plan discussed with CRNA.               Katerin Nair MD 1/13/2023    Pt seen and evaluated pre-procedure. Risks and benefits of anesthetic plan discussed as per custom.   Mago Fishman, ESPERANZA - CRNA

## 2023-01-13 NOTE — BRIEF OP NOTE
Brief Postoperative Note      Patient: Janay Sol  YOB: 1980  MRN: [id number]    Date of Procedure: 1/13/2023    Pre-Op Diagnosis: Ureteral stone [N20.1]    Post-Op Diagnosis: Same       Procedure(s):  CYSTOSCOPY RETROGRADE PYELOGRAM URETEROSCOPY J STENT LASER LITHOTRIPSY LEFT    Surgeon(s):  Lizy Valerio DO    Assistant:  * No surgical staff found *    Anesthesia: Monitor Anesthesia Care    Estimated Blood Loss (mL): Minimal    Complications: None    Specimens:   * No specimens in log *    Implants:  * No implants in log *      Drains:   [REMOVED] Ureteral Drain/Stent 12/13/22 Left Ureter (Removed)       Findings: see operative report     Electronically signed by Lizy Valerio DO on 1/13/2023 at 8:12 AM

## 2023-01-13 NOTE — H&P
1/13/2023 8:10 AM  Service: Urology  Group: [facility] ([facility]ud[facility])    Maria Teresa Kuhn  [id number]     Chief Complaint: 5 mm proximal left ureteral stone     History of Present Illness: The patient is a 40 y.o. female patient who presents with the above  The risk of the surgery was discussed at length. The risk of the anesthesia and loss of airway. Cardiovascular risks, myocardial infraction, cerebral vascular accident, pulmonary embolism, deep vein thrombosis. Injury risk, bowel injury, bladder injury, ureteral injury, blood vessel injury, delayed presentation of the injury (ie scar tissue or stricture formation). These can lead to bleeding, requiring transfusion. He risk of infection with can lead to sepsis. The risk of death. The may be other issues that present that are not listed above which can occur. The patient understood these risks, they understood the benefits, they understood the alternatives and fully consent to proceed. The needs for a second procedure may also be required.    We discussed placing to sleep  Looking in the bladder shooting x-rays if needed  Seeing the stone  Getting to the stone  Placement of laser on the stone  Sometimes we use a stone basket  90% of the time being able to get the stone managed  10% we see the stone but cannot get to the stone  We place a stent in those situations and represent for another procedure in 2-3 weeks  If we see infected urine, we place a stent an come back for a second procedure once treated  1/1000 the stone is so impacted we have to abort the surgery and place a PNT  Then additional procedures will need to be preformed     Past Medical History:   Diagnosis Date    Anxiety     Asthma     Hydrocephalus ([address]     Left ureteral stone     Low iron     Migraines        Past Surgical History:   Procedure Laterality Date    BLADDER SURGERY Left 12/13/2022    CYSTOSCOPY RETROGRADE PYELOGRAM LEFT STENT INSERTION performed by Bela Shay MD [facility] at [address] [place] [address]      all teeth removed 11/21/22    FINGER SURGERY      R 5th finger    REFRACTIVE SURGERY Bilateral     age 1-4 yrs old    SHUNT EXTERNALIZATION      several surgeries in childhood; last one approx 1983       Medications Prior to Admission:    No medications prior to admission. Allergies:    Aspirin and Adhesive tape    Social History:    reports that she has never smoked. She has never used smokeless tobacco. She reports that she does not currently use alcohol. She reports that she does not currently use drugs after having used the following drugs: Marijuana Seymour Ross). Family History:   Non-contributory to this urological problem  family history includes Cancer in her paternal grandfather; Depression in her father and mother; Mental Illness in her sister. Review of Systems:  Respiratory: negative for cough and hemoptysis  Cardiovascular: negative for chest pain and dyspnea  Gastrointestinal: negative for abdominal pain, diarrhea, nausea and vomiting  Derm: negative for rash and skin lesion(s)  Neurological: negative for seizures and tremors  Endocrine: negative for diabetic symptoms including polydipsia and polyuria  : As above in the HPI, otherwise negative  All other reviews are negative    Physical Exam:   Vitals: /80   Pulse 68   Temp 97.6 °F (36.4 °C) (Temporal)   Resp 18   Ht 5' 6\" (1.676 m)   Wt 174 lb (78.9 kg)   LMP 12/16/2022 (Approximate)   SpO2 98%   BMI 28.08 kg/m²   General:  Awake, alert, oriented X 3. Well developed, well nourished, well groomed. No apparent distress. HEENT:  Normocephalic, atraumatic. Pupils equal, round. No scleral icterus. No conjunctival injection. Normal lips, teeth, and gums. No nasal discharge. Neck:  Supple, no masses. Heart:  RRR  Lungs:  No audible wheezing. Respirations symmetric and non-labored.   Abdomen:  soft, nontender, no masses, no organomegaly, no peritoneal signs  Extremities:  No clubbing, cyanosis, or edema  Skin:  Warm and dry, no open lesions or rashes  Neuro:  Cranial nerves 2-12 intact, no focal deficits  Rectal: deferred  Genitalia:  Martines no    Labs:   No results for input(s): WBC, RBC, HGB, HCT, MCV, MCH, MCHC, RDW, PLT, MPV in the last 72 hours. No results for input(s): CREATININE in the last 72 hours.     Images:      Assessment: Adelina Dowell [address] y.o. female     5 mm left UPJ calc    Plan:    See the outpatient H&P  All options were discussed  The patient family is present  Progress to the OR for C&P, ULL, SBE, left stent exchange  The risks, benefits, and alternatives were discussed  NPO  DVT prophylaxis  Pre-op antibiotics     DO [facility]  Urology

## 2023-01-13 NOTE — OP NOTE
[id number] [address]                                OPERATIVE REPORT    PATIENT NAME: Waldemar Neumann                   :        1980  MED REC NO:   [id number]                            ROOM:  ACCOUNT NO:   [de-identified]                           ADMIT DATE: 2023  PROVIDER:     Jm Valerio DO    DATE OF PROCEDURE:  2023    PREOPERATIVE DIAGNOSES:  1.  A 5-mm proximal left ureteral stone. 2.  Left hydronephrosis. 3.  Left flank pain. 4.  Status post left stent insertion with associated UTI and sepsis. POSTOPERATIVE DIAGNOSES:  1.  A 5-mm proximal left ureteral stone. 2.  Left hydronephrosis. 3.  Left flank pain. 4.  Status post left stent insertion with associated UTI and sepsis. PROCEDURE PERFORMED:  The patient had:  1. Cystoscopy. 2.  Complex flexible ureteroscopy. 3.  Laser lithotripsy. 4.  Left stent exchange. 5.  Stent was left on a string. ANESTHESIA:  Monitored anesthesia. SURGEON:  Sp Valerio DO.    ESTIMATED BLOOD LOSS:  None. CONDITION:  To PACU, stable. Preoperative antibiotics were administered. PATHOLOGIC SPECIMEN:  None. STORY ON THIS PATIENT:  This is actually a pleasant 81-year-old   female who presents [place] [address] on the aforementioned date with the above diagnoses. Please note approximately one month ago, she had a stent placed and has  done quite well. She had associated UTI with sepsis. She presents for  definitive urologic intervention today for management of the stone. The  risks, the benefits, and the alternative of the proposed surgical  procedure were extensively explained to her as well as her  who  was present. They understood these and elected to proceed. You can see  H and P for the detailed discussion, which occurred.     DESCRIPTION OF PROCEDURE:  This pleasant 42-year  female was  brought to the operating room #6 at [address], placed in the supine position, induced with monitored  anesthesia. Anesthesia monitored the head and neck area, IV access, and  vital signs throughout the course of the case. Status post induction,  the patient was placed in the dorsal lithotomy position. All underlying  points were pressure padded. SCDs were applied. Prepped and draped in  normal sterile fashion. Proceeded by taking a 21-Kinyarwanda Olympus scope  with a 30-degree lens inserted through the meatus in an atraumatic  fashion. Panendoscopic visualization of the bladder was devoid of any  significant masses, tumors, lesions, or defects. Alongside the stent, I  placed a 0.035 Glidewire. I removed the stent. I then took an 8/10  coaxial ureteral dilator with inner and outer component, coursed easily  into the distal ureter, removed the inner component, the outer component  remained. I was able to then place a second wire being a Bentson wire. I removed the outer component. Over the Bentson wire, I took a 12 x  28-Kinyarwanda ureteral access sheath, which coursed easily into the distal  ureter without complication. I removed the inner component and the  Bentson wire, took a flexible ureteroscope with normal saline under  pressure, coursed easily into the distal, mid, and proximal ureter. Max Lacks was easily identified in the renal pelvis, placed a 242 holmium  laser fiber on the stone with setting of 0.3 and 70 on dusting and 0.8  and 20 on fragmentation. The stone was systematically laser ablated  into 1-mm fragments. Fragments were too small for grasping, so at this  point I removed the ureteroscope and ureteral access sheath. Over the  0.035 Glidewire, which remained, I placed a 6 x 26 double-J stent with  the string left on. The patient's bladder was drained.   She awoke from  anesthesia without complication, brought to PACU in stable condition. PLAN:  1. She can be discharged home today. 2.  Stent can be removed on Wednesday of next week. 3.  Findings were conveyed to her family who was present. 4.  No intraoperative complications. 6.  Prescriptions were sent in for her.         [address], DO    D: 01/13/2023 9:20:28       T: 01/13/2023 9:23:04     [id number]  Job#: [id number]     Doc#: [id number]    CC:  Primary Care Physician

## 2023-07-19 ENCOUNTER — OFFICE VISIT (OUTPATIENT)
Age: 43
End: 2023-07-19
Payer: COMMERCIAL

## 2023-07-19 VITALS
RESPIRATION RATE: 16 BRPM | HEIGHT: 66 IN | SYSTOLIC BLOOD PRESSURE: 116 MMHG | HEART RATE: 77 BPM | OXYGEN SATURATION: 97 % | DIASTOLIC BLOOD PRESSURE: 74 MMHG | WEIGHT: 193 LBS | TEMPERATURE: 98.1 F | BODY MASS INDEX: 31.02 KG/M2

## 2023-07-19 DIAGNOSIS — G91.9 HYDROCEPHALUS, UNSPECIFIED TYPE (HCC): ICD-10-CM

## 2023-07-19 DIAGNOSIS — J30.2 SEASONAL ALLERGIC RHINITIS, UNSPECIFIED TRIGGER: Primary | ICD-10-CM

## 2023-07-19 PROCEDURE — 99213 OFFICE O/P EST LOW 20 MIN: CPT | Performed by: FAMILY MEDICINE

## 2023-07-19 RX ORDER — FLUTICASONE PROPIONATE 50 MCG
1 SPRAY, SUSPENSION (ML) NASAL DAILY
Qty: 32 G | Refills: 3 | Status: SHIPPED | OUTPATIENT
Start: 2023-07-19

## 2023-07-19 SDOH — ECONOMIC STABILITY: FOOD INSECURITY: WITHIN THE PAST 12 MONTHS, THE FOOD YOU BOUGHT JUST DIDN'T LAST AND YOU DIDN'T HAVE MONEY TO GET MORE.: PATIENT DECLINED

## 2023-07-19 SDOH — ECONOMIC STABILITY: HOUSING INSECURITY
IN THE LAST 12 MONTHS, WAS THERE A TIME WHEN YOU DID NOT HAVE A STEADY PLACE TO SLEEP OR SLEPT IN A SHELTER (INCLUDING NOW)?: NO

## 2023-07-19 SDOH — ECONOMIC STABILITY: INCOME INSECURITY: HOW HARD IS IT FOR YOU TO PAY FOR THE VERY BASICS LIKE FOOD, HOUSING, MEDICAL CARE, AND HEATING?: SOMEWHAT HARD

## 2023-07-19 SDOH — ECONOMIC STABILITY: FOOD INSECURITY: WITHIN THE PAST 12 MONTHS, YOU WORRIED THAT YOUR FOOD WOULD RUN OUT BEFORE YOU GOT MONEY TO BUY MORE.: PATIENT DECLINED

## 2023-07-19 SDOH — ECONOMIC STABILITY: TRANSPORTATION INSECURITY
IN THE PAST 12 MONTHS, HAS LACK OF TRANSPORTATION KEPT YOU FROM MEETINGS, WORK, OR FROM GETTING THINGS NEEDED FOR DAILY LIVING?: NO

## 2023-07-19 ASSESSMENT — ENCOUNTER SYMPTOMS
SINUS PRESSURE: 1
COUGH: 0
ABDOMINAL PAIN: 0
VOMITING: 0
DIARRHEA: 0
WHEEZING: 0
CONSTIPATION: 0
NAUSEA: 0
SHORTNESS OF BREATH: 0

## 2023-07-19 NOTE — PROGRESS NOTES
[facility]  DATE OF VISIT : 2023    Patient : Susan Segovia   Age : 40 y.o.  : 1980   MRN : [id number]   ______________________________________________________________________    Chief Complaint :   Chief Complaint   Patient presents with    Dizziness     Patient presents today with c/o of feeling winded. Patient states she has been feeling short of breathe for a few months. HPI : Susan Segovia is 40 y.o. female who presented to the clinic today for office visit. History of asthma: intermittent SOB specially when climbing stairs and walking long distances. Also notes recently she has been having episodes of wheezing. Denies any congestion or rhinorrhea but does endorse feeling a \"knot\"    History of  hydrocephalus s/p  shunt in  which is currently nonfunctional at the time. She was seen by neurology who recommended patient had Shut positioning and function assessed. Patient did not get her study done yet. She was also seen in ED recently for nephrolithiasis and urethral stone. Ultrasound pelvis was done and showed moderate free fluid within the pelvis that could represent a peritoneal CSF pseudocyst. Unremarkable sonographic evaluation of uterus and bilateral ovaries. A catheter fragment coiled within the pelvis was noted but does not appear to be connected to the ventriculoperitoneal shunt catheter in the upper abdomen. I reviewed the patient's past medications, allergies and past medical history during this visit.     Past Medical History :    Past Medical History:   Diagnosis Date    Anxiety     Asthma     Hydrocephalus ([address])     Left ureteral stone     Low iron     Migraines      Past Surgical History:   Procedure Laterality Date    BLADDER SURGERY Left 2022    CYSTOSCOPY RETROGRADE PYELOGRAM LEFT STENT INSERTION performed by Toro Clark MD at [address] [place] [address]      all teeth removed 22    FINGER SURGERY      R

## 2023-08-09 ENCOUNTER — TELEPHONE (OUTPATIENT)
Age: 43
End: 2023-08-09

## 2023-08-09 NOTE — TELEPHONE ENCOUNTER
Called patient and left a voicemail asking patient to call office back to change her appt on 9/7/23 because Doctor will not be in office that afternoon to have her rescheduled.

## (undated) DEVICE — GAUZE,SPONGE,4"X4",8PLY,STRL,LF,10/TRAY: Brand: MEDLINE

## (undated) DEVICE — 4-PORT MANIFOLD: Brand: NEPTUNE 2

## (undated) DEVICE — MEDICINE CUP, GRADUATED, STER: Brand: MEDLINE

## (undated) DEVICE — TUBING, SUCTION, 3/16" X 12', STRAIGHT: Brand: MEDLINE

## (undated) DEVICE — SOLUTION IV IRRIG WATER 1000ML POUR BRL 2F7114

## (undated) DEVICE — BAG DRNGE COMB PK

## (undated) DEVICE — GLOVE ORANGE PI 7 1/2   MSG9075

## (undated) DEVICE — GUIDEWIRE ENDOSCP L150CM DIA0.035IN TIP L15CM BENT PTFE

## (undated) DEVICE — CYSTO PACK: Brand: MEDLINE INDUSTRIES, INC.

## (undated) DEVICE — SOLUTION IRRIG 3000ML STRL H2O USP UROMATIC PLAS CONT

## (undated) DEVICE — SYRINGE MED 30ML STD CLR PLAS LUERLOCK TIP N CTRL DISP

## (undated) DEVICE — GOWN,SIRUS,FABRNF,XL,20/CS: Brand: MEDLINE

## (undated) DEVICE — Device

## (undated) DEVICE — DILATOR/SHEATH SET: Brand: 8/10 DILATOR/SHEATH SET

## (undated) DEVICE — FLEXOR, URETERAL ACCESS SHEATH WITH AQ, HYDROPHILIC COATING: Brand: FLEXOR

## (undated) DEVICE — SOLUTION SURG PREP ANTIMICROBIAL 4 OZ SKIN WND EXIDINE

## (undated) DEVICE — FLEXIVA  PULSE  AND  FLEXIVA  PULSE  TRACTIP  LASER  FIBERS  ARE  HIGH  POWER  SINGLE-USE FIBER: Brand: FLEXIVA PULSE ID

## (undated) DEVICE — HOSE CONN FOR WST MGMT SYS NEPTUNE 2

## (undated) DEVICE — SOLUTION IRRIG 3000ML 0.9% SOD CHL USP UROMATIC PLAS CONT